# Patient Record
Sex: MALE | Race: WHITE | NOT HISPANIC OR LATINO
[De-identification: names, ages, dates, MRNs, and addresses within clinical notes are randomized per-mention and may not be internally consistent; named-entity substitution may affect disease eponyms.]

---

## 2017-04-05 PROBLEM — Z00.00 ENCOUNTER FOR PREVENTIVE HEALTH EXAMINATION: Status: ACTIVE | Noted: 2017-04-05

## 2017-04-06 ENCOUNTER — APPOINTMENT (OUTPATIENT)
Dept: ORTHOPEDIC SURGERY | Facility: CLINIC | Age: 72
End: 2017-04-06

## 2017-04-06 VITALS — WEIGHT: 188 LBS | HEIGHT: 72 IN | BODY MASS INDEX: 25.47 KG/M2

## 2017-04-06 VITALS — DIASTOLIC BLOOD PRESSURE: 87 MMHG | HEART RATE: 84 BPM | SYSTOLIC BLOOD PRESSURE: 136 MMHG

## 2017-04-06 DIAGNOSIS — Z78.9 OTHER SPECIFIED HEALTH STATUS: ICD-10-CM

## 2017-04-06 DIAGNOSIS — M25.569 PAIN IN UNSPECIFIED KNEE: ICD-10-CM

## 2017-05-11 PROBLEM — Z78.9 EXERCISES OCCASIONALLY: Status: ACTIVE | Noted: 2017-04-06

## 2017-05-11 PROBLEM — Z78.9 DENIES ALCOHOL CONSUMPTION: Status: ACTIVE | Noted: 2017-04-06

## 2017-05-11 PROBLEM — Z78.9 DOES NOT USE ILLICIT DRUGS: Status: ACTIVE | Noted: 2017-04-06

## 2017-05-11 RX ORDER — CHOLECALCIFEROL (VITAMIN D3) 25 MCG
TABLET ORAL
Refills: 0 | Status: ACTIVE | COMMUNITY

## 2017-05-11 RX ORDER — RIBOFLAVIN (VITAMIN B2) 100 MG
TABLET ORAL
Refills: 0 | Status: ACTIVE | COMMUNITY

## 2017-05-11 RX ORDER — BLOOD SUGAR DIAGNOSTIC
STRIP MISCELLANEOUS
Qty: 400 | Refills: 0 | Status: ACTIVE | COMMUNITY
Start: 2016-12-08

## 2017-05-11 RX ORDER — GLUC/MSM/COLGN2/HYAL/ANTIARTH3 375-375-20
TABLET ORAL
Refills: 0 | Status: ACTIVE | COMMUNITY

## 2017-05-11 RX ORDER — MULTIVIT-MIN/FA/LYCOPEN/LUTEIN .4-300-25
TABLET ORAL
Refills: 0 | Status: ACTIVE | COMMUNITY

## 2017-05-11 RX ORDER — ASCORBIC ACID 500 MG
TABLET ORAL
Refills: 0 | Status: ACTIVE | COMMUNITY

## 2017-05-30 ENCOUNTER — RX RENEWAL (OUTPATIENT)
Age: 72
End: 2017-05-30

## 2017-07-14 ENCOUNTER — RX RENEWAL (OUTPATIENT)
Age: 72
End: 2017-07-14

## 2017-07-14 ENCOUNTER — APPOINTMENT (OUTPATIENT)
Dept: ORTHOPEDIC SURGERY | Facility: CLINIC | Age: 72
End: 2017-07-14

## 2017-07-14 VITALS
HEART RATE: 81 BPM | SYSTOLIC BLOOD PRESSURE: 136 MMHG | WEIGHT: 188 LBS | HEIGHT: 72 IN | DIASTOLIC BLOOD PRESSURE: 72 MMHG | BODY MASS INDEX: 25.47 KG/M2

## 2017-08-10 ENCOUNTER — RX RENEWAL (OUTPATIENT)
Age: 72
End: 2017-08-10

## 2017-08-20 ENCOUNTER — RX RENEWAL (OUTPATIENT)
Age: 72
End: 2017-08-20

## 2017-09-07 ENCOUNTER — RX RENEWAL (OUTPATIENT)
Age: 72
End: 2017-09-07

## 2017-09-08 ENCOUNTER — APPOINTMENT (OUTPATIENT)
Dept: ORTHOPEDIC SURGERY | Facility: CLINIC | Age: 72
End: 2017-09-08
Payer: MEDICARE

## 2017-09-08 VITALS
WEIGHT: 188 LBS | BODY MASS INDEX: 25.47 KG/M2 | SYSTOLIC BLOOD PRESSURE: 134 MMHG | HEIGHT: 72 IN | HEART RATE: 82 BPM | DIASTOLIC BLOOD PRESSURE: 77 MMHG

## 2017-09-08 PROCEDURE — 99214 OFFICE O/P EST MOD 30 MIN: CPT

## 2017-10-09 ENCOUNTER — RX RENEWAL (OUTPATIENT)
Age: 72
End: 2017-10-09

## 2017-11-07 ENCOUNTER — RX RENEWAL (OUTPATIENT)
Age: 72
End: 2017-11-07

## 2017-11-21 ENCOUNTER — OUTPATIENT (OUTPATIENT)
Dept: OUTPATIENT SERVICES | Facility: HOSPITAL | Age: 72
LOS: 1 days | End: 2017-11-21
Payer: MEDICARE

## 2017-11-21 VITALS
WEIGHT: 199.96 LBS | SYSTOLIC BLOOD PRESSURE: 130 MMHG | DIASTOLIC BLOOD PRESSURE: 84 MMHG | HEART RATE: 81 BPM | HEIGHT: 72 IN | TEMPERATURE: 97 F | RESPIRATION RATE: 16 BRPM

## 2017-11-21 DIAGNOSIS — M17.11 UNILATERAL PRIMARY OSTEOARTHRITIS, RIGHT KNEE: ICD-10-CM

## 2017-11-21 DIAGNOSIS — E11.9 TYPE 2 DIABETES MELLITUS WITHOUT COMPLICATIONS: ICD-10-CM

## 2017-11-21 DIAGNOSIS — I10 ESSENTIAL (PRIMARY) HYPERTENSION: ICD-10-CM

## 2017-11-21 DIAGNOSIS — Z98.890 OTHER SPECIFIED POSTPROCEDURAL STATES: Chronic | ICD-10-CM

## 2017-11-21 DIAGNOSIS — E03.9 HYPOTHYROIDISM, UNSPECIFIED: ICD-10-CM

## 2017-11-21 DIAGNOSIS — E78.5 HYPERLIPIDEMIA, UNSPECIFIED: ICD-10-CM

## 2017-11-21 LAB
APPEARANCE UR: CLEAR — SIGNIFICANT CHANGE UP
BILIRUB UR-MCNC: NEGATIVE — SIGNIFICANT CHANGE UP
BLD GP AB SCN SERPL QL: NEGATIVE — SIGNIFICANT CHANGE UP
BLOOD UR QL VISUAL: NEGATIVE — SIGNIFICANT CHANGE UP
BUN SERPL-MCNC: 25 MG/DL — HIGH (ref 7–23)
CALCIUM SERPL-MCNC: 9.8 MG/DL — SIGNIFICANT CHANGE UP (ref 8.4–10.5)
CHLORIDE SERPL-SCNC: 100 MMOL/L — SIGNIFICANT CHANGE UP (ref 98–107)
CO2 SERPL-SCNC: 28 MMOL/L — SIGNIFICANT CHANGE UP (ref 22–31)
COLOR SPEC: SIGNIFICANT CHANGE UP
CREAT SERPL-MCNC: 0.91 MG/DL — SIGNIFICANT CHANGE UP (ref 0.5–1.3)
GLUCOSE SERPL-MCNC: 115 MG/DL — HIGH (ref 70–99)
GLUCOSE UR-MCNC: NEGATIVE — SIGNIFICANT CHANGE UP
HBA1C BLD-MCNC: 6.1 % — HIGH (ref 4–5.6)
HCT VFR BLD CALC: 46.5 % — SIGNIFICANT CHANGE UP (ref 39–50)
HGB BLD-MCNC: 14.7 G/DL — SIGNIFICANT CHANGE UP (ref 13–17)
KETONES UR-MCNC: NEGATIVE — SIGNIFICANT CHANGE UP
LEUKOCYTE ESTERASE UR-ACNC: NEGATIVE — SIGNIFICANT CHANGE UP
MCHC RBC-ENTMCNC: 28.9 PG — SIGNIFICANT CHANGE UP (ref 27–34)
MCHC RBC-ENTMCNC: 31.6 % — LOW (ref 32–36)
MCV RBC AUTO: 91.4 FL — SIGNIFICANT CHANGE UP (ref 80–100)
NITRITE UR-MCNC: NEGATIVE — SIGNIFICANT CHANGE UP
NRBC # FLD: 0 — SIGNIFICANT CHANGE UP
PH UR: 6 — SIGNIFICANT CHANGE UP (ref 4.6–8)
PLATELET # BLD AUTO: 369 K/UL — SIGNIFICANT CHANGE UP (ref 150–400)
PMV BLD: 9.9 FL — SIGNIFICANT CHANGE UP (ref 7–13)
POTASSIUM SERPL-MCNC: 4.7 MMOL/L — SIGNIFICANT CHANGE UP (ref 3.5–5.3)
POTASSIUM SERPL-SCNC: 4.7 MMOL/L — SIGNIFICANT CHANGE UP (ref 3.5–5.3)
PROT UR-MCNC: NEGATIVE — SIGNIFICANT CHANGE UP
RBC # BLD: 5.09 M/UL — SIGNIFICANT CHANGE UP (ref 4.2–5.8)
RBC # FLD: 14.1 % — SIGNIFICANT CHANGE UP (ref 10.3–14.5)
RH IG SCN BLD-IMP: POSITIVE — SIGNIFICANT CHANGE UP
SODIUM SERPL-SCNC: 142 MMOL/L — SIGNIFICANT CHANGE UP (ref 135–145)
SP GR SPEC: 1.01 — SIGNIFICANT CHANGE UP (ref 1–1.03)
SQUAMOUS # UR AUTO: SIGNIFICANT CHANGE UP
UROBILINOGEN FLD QL: NORMAL E.U. — SIGNIFICANT CHANGE UP (ref 0.1–0.2)
WBC # BLD: 8.73 K/UL — SIGNIFICANT CHANGE UP (ref 3.8–10.5)
WBC # FLD AUTO: 8.73 K/UL — SIGNIFICANT CHANGE UP (ref 3.8–10.5)
WBC UR QL: SIGNIFICANT CHANGE UP (ref 0–?)

## 2017-11-21 PROCEDURE — 93010 ELECTROCARDIOGRAM REPORT: CPT

## 2017-11-21 RX ORDER — PANTOPRAZOLE SODIUM 20 MG/1
40 TABLET, DELAYED RELEASE ORAL ONCE
Qty: 0 | Refills: 0 | Status: COMPLETED | OUTPATIENT
Start: 2017-12-05 | End: 2017-12-05

## 2017-11-21 RX ORDER — TRAMADOL HYDROCHLORIDE 50 MG/1
50 TABLET ORAL ONCE
Qty: 0 | Refills: 0 | Status: DISCONTINUED | OUTPATIENT
Start: 2017-12-05 | End: 2017-12-05

## 2017-11-21 RX ORDER — GABAPENTIN 400 MG/1
100 CAPSULE ORAL ONCE
Qty: 0 | Refills: 0 | Status: COMPLETED | OUTPATIENT
Start: 2017-12-05 | End: 2017-12-05

## 2017-11-21 RX ORDER — ACETAMINOPHEN 500 MG
975 TABLET ORAL ONCE
Qty: 0 | Refills: 0 | Status: COMPLETED | OUTPATIENT
Start: 2017-12-05 | End: 2017-12-05

## 2017-11-21 RX ORDER — SODIUM CHLORIDE 9 MG/ML
3 INJECTION INTRAMUSCULAR; INTRAVENOUS; SUBCUTANEOUS EVERY 8 HOURS
Qty: 0 | Refills: 0 | Status: DISCONTINUED | OUTPATIENT
Start: 2017-12-05 | End: 2017-12-07

## 2017-11-21 RX ORDER — SODIUM CHLORIDE 9 MG/ML
1000 INJECTION, SOLUTION INTRAVENOUS
Qty: 0 | Refills: 0 | Status: DISCONTINUED | OUTPATIENT
Start: 2017-12-05 | End: 2017-12-05

## 2017-11-21 NOTE — H&P PST ADULT - ENDOCRINE COMMENTS
Hypothyroidism & T2DM - reports blood sugar done every morning less than 100mg/dl, and reports she gets regular thyroid checks with Endocrinologist

## 2017-11-21 NOTE — H&P PST ADULT - LYMPHATIC
supraclavicular R/posterior cervical R/posterior cervical L/anterior cervical L/anterior cervical R/supraclavicular L

## 2017-11-21 NOTE — H&P PST ADULT - GASTROINTESTINAL DETAILS
soft/nontender/no distention/bowel sounds normal nontender/bowel sounds normal/soft/no distention/no masses palpable

## 2017-11-21 NOTE — H&P PST ADULT - NEGATIVE CARDIOVASCULAR SYMPTOMS
no peripheral edema/no claudication/no dyspnea on exertion/no chest pain/no orthopnea/no paroxysmal nocturnal dyspnea/no palpitations

## 2017-11-21 NOTE — H&P PST ADULT - NSANTHOSAYNRD_GEN_A_CORE
No. GOYO screening performed.  STOP BANG Legend: 0-2 = LOW Risk; 3-4 = INTERMEDIATE Risk; 5-8 = HIGH Risk

## 2017-11-21 NOTE — H&P PST ADULT - PMH
Basal cell carcinoma    Diabetes mellitus type II, controlled    Hyperlipidemia    Hypertension    Hypothyroidism    Osteoarthritis

## 2017-11-21 NOTE — H&P PST ADULT - PROBLEM SELECTOR PLAN 1
Total Knee Replacement, Right scheduled for 12/04/2017. Total Knee Replacement, Right scheduled for 12/04/2017.  Pre-op instructions given. Pt verbalized understanding.  Pepcid given for GI prophylaxis.  Nasal culture instructions given.  Chlorhexidine wash instructions given.  ABO ordered STAT for day of procedure.  Accu-Chek ordered STAT for day of procedure.  Medical clearance requested.

## 2017-11-21 NOTE — H&P PST ADULT - HISTORY OF PRESENT ILLNESS
70yo male with medical h/o HTN, HDL, Hypothyroidism, T2DM, Hearing impairment with bilateral hearing aid and OA, right knee. Pt presents today for presurgical evaluation for Total Knee Replacement, Right scheduled for 12/04/2017.

## 2017-11-21 NOTE — H&P PST ADULT - ACTIVITY
ADLs, chores, walks a lot , ride a bike, used to run marathons but stopped x 1 year because of the knee

## 2017-11-21 NOTE — H&P PST ADULT - PROBLEM SELECTOR PLAN 2
Pt instructed to take meds as prescribed Pt instructed to take meds as prescribed.  GOYO precaution - OR booking notified

## 2017-11-21 NOTE — H&P PST ADULT - MUSCULOSKELETAL
details… detailed exam no joint warmth/no calf tenderness/normal strength/no joint swelling/no joint erythema/decreased ROM due to pain

## 2017-11-22 LAB
SPECIMEN SOURCE: SIGNIFICANT CHANGE UP
SPECIMEN SOURCE: SIGNIFICANT CHANGE UP

## 2017-11-23 LAB
BACTERIA NPH CULT: SIGNIFICANT CHANGE UP
BACTERIA UR CULT: SIGNIFICANT CHANGE UP

## 2017-11-27 ENCOUNTER — RX RENEWAL (OUTPATIENT)
Age: 72
End: 2017-11-27

## 2017-11-27 RX ORDER — MUPIROCIN 20 MG/G
2 OINTMENT TOPICAL
Qty: 22 | Refills: 0 | Status: ACTIVE | COMMUNITY
Start: 2017-11-27 | End: 1900-01-01

## 2017-11-29 ENCOUNTER — OTHER (OUTPATIENT)
Age: 72
End: 2017-11-29

## 2017-11-29 DIAGNOSIS — M17.11 UNILATERAL PRIMARY OSTEOARTHRITIS, RIGHT KNEE: ICD-10-CM

## 2017-11-29 DIAGNOSIS — M17.0 BILATERAL PRIMARY OSTEOARTHRITIS OF KNEE: ICD-10-CM

## 2017-12-04 ENCOUNTER — FORM ENCOUNTER (OUTPATIENT)
Age: 72
End: 2017-12-04

## 2017-12-04 NOTE — ASU PATIENT PROFILE, ADULT - PSH
S/P Mohs surgery for basal cell carcinoma Cancer of tear duct, right    H/O left inguinal hernia repair    S/P Mohs surgery for basal cell carcinoma

## 2017-12-05 ENCOUNTER — APPOINTMENT (OUTPATIENT)
Dept: ORTHOPEDIC SURGERY | Facility: HOSPITAL | Age: 72
End: 2017-12-05

## 2017-12-05 ENCOUNTER — RESULT REVIEW (OUTPATIENT)
Age: 72
End: 2017-12-05

## 2017-12-05 ENCOUNTER — RX RENEWAL (OUTPATIENT)
Age: 72
End: 2017-12-05

## 2017-12-05 ENCOUNTER — INPATIENT (INPATIENT)
Facility: HOSPITAL | Age: 72
LOS: 1 days | Discharge: HOME HEALTH | End: 2017-12-07
Attending: ORTHOPAEDIC SURGERY | Admitting: ORTHOPAEDIC SURGERY
Payer: MEDICARE

## 2017-12-05 VITALS
DIASTOLIC BLOOD PRESSURE: 71 MMHG | OXYGEN SATURATION: 96 % | HEIGHT: 72 IN | WEIGHT: 199.96 LBS | RESPIRATION RATE: 16 BRPM | SYSTOLIC BLOOD PRESSURE: 126 MMHG | HEART RATE: 79 BPM | TEMPERATURE: 98 F

## 2017-12-05 DIAGNOSIS — Z98.890 OTHER SPECIFIED POSTPROCEDURAL STATES: Chronic | ICD-10-CM

## 2017-12-05 DIAGNOSIS — C69.51: Chronic | ICD-10-CM

## 2017-12-05 DIAGNOSIS — M17.11 UNILATERAL PRIMARY OSTEOARTHRITIS, RIGHT KNEE: ICD-10-CM

## 2017-12-05 LAB
BUN SERPL-MCNC: 22 MG/DL — SIGNIFICANT CHANGE UP (ref 7–23)
CALCIUM SERPL-MCNC: 8.6 MG/DL — SIGNIFICANT CHANGE UP (ref 8.4–10.5)
CHLORIDE SERPL-SCNC: 101 MMOL/L — SIGNIFICANT CHANGE UP (ref 98–107)
CO2 SERPL-SCNC: 27 MMOL/L — SIGNIFICANT CHANGE UP (ref 22–31)
CREAT SERPL-MCNC: 0.96 MG/DL — SIGNIFICANT CHANGE UP (ref 0.5–1.3)
GLUCOSE BLDC GLUCOMTR-MCNC: 132 MG/DL — HIGH (ref 70–99)
GLUCOSE BLDC GLUCOMTR-MCNC: 205 MG/DL — HIGH (ref 70–99)
GLUCOSE BLDC GLUCOMTR-MCNC: 235 MG/DL — HIGH (ref 70–99)
GLUCOSE SERPL-MCNC: 219 MG/DL — HIGH (ref 70–99)
HCT VFR BLD CALC: 40.6 % — SIGNIFICANT CHANGE UP (ref 39–50)
HGB BLD-MCNC: 13.4 G/DL — SIGNIFICANT CHANGE UP (ref 13–17)
MCHC RBC-ENTMCNC: 29.3 PG — SIGNIFICANT CHANGE UP (ref 27–34)
MCHC RBC-ENTMCNC: 33 % — SIGNIFICANT CHANGE UP (ref 32–36)
MCV RBC AUTO: 88.6 FL — SIGNIFICANT CHANGE UP (ref 80–100)
NRBC # FLD: 0 — SIGNIFICANT CHANGE UP
PLATELET # BLD AUTO: 307 K/UL — SIGNIFICANT CHANGE UP (ref 150–400)
PMV BLD: 9.3 FL — SIGNIFICANT CHANGE UP (ref 7–13)
POTASSIUM SERPL-MCNC: 4.7 MMOL/L — SIGNIFICANT CHANGE UP (ref 3.5–5.3)
POTASSIUM SERPL-SCNC: 4.7 MMOL/L — SIGNIFICANT CHANGE UP (ref 3.5–5.3)
RBC # BLD: 4.58 M/UL — SIGNIFICANT CHANGE UP (ref 4.2–5.8)
RBC # FLD: 13.9 % — SIGNIFICANT CHANGE UP (ref 10.3–14.5)
RH IG SCN BLD-IMP: POSITIVE — SIGNIFICANT CHANGE UP
SODIUM SERPL-SCNC: 139 MMOL/L — SIGNIFICANT CHANGE UP (ref 135–145)
WBC # BLD: 8.13 K/UL — SIGNIFICANT CHANGE UP (ref 3.8–10.5)
WBC # FLD AUTO: 8.13 K/UL — SIGNIFICANT CHANGE UP (ref 3.8–10.5)

## 2017-12-05 PROCEDURE — 88305 TISSUE EXAM BY PATHOLOGIST: CPT | Mod: 26

## 2017-12-05 PROCEDURE — 88311 DECALCIFY TISSUE: CPT | Mod: 26

## 2017-12-05 PROCEDURE — 27447 TOTAL KNEE ARTHROPLASTY: CPT | Mod: RT

## 2017-12-05 PROCEDURE — 73560 X-RAY EXAM OF KNEE 1 OR 2: CPT | Mod: 26,RT

## 2017-12-05 RX ORDER — TRAMADOL HYDROCHLORIDE 50 MG/1
50 TABLET ORAL EVERY 8 HOURS
Qty: 0 | Refills: 0 | Status: DISCONTINUED | OUTPATIENT
Start: 2017-12-05 | End: 2017-12-05

## 2017-12-05 RX ORDER — DEXTROSE 50 % IN WATER 50 %
12.5 SYRINGE (ML) INTRAVENOUS ONCE
Qty: 0 | Refills: 0 | Status: DISCONTINUED | OUTPATIENT
Start: 2017-12-05 | End: 2017-12-07

## 2017-12-05 RX ORDER — SODIUM CHLORIDE 9 MG/ML
1000 INJECTION INTRAMUSCULAR; INTRAVENOUS; SUBCUTANEOUS ONCE
Qty: 0 | Refills: 0 | Status: COMPLETED | OUTPATIENT
Start: 2017-12-06 | End: 2017-12-06

## 2017-12-05 RX ORDER — GLUCAGON INJECTION, SOLUTION 0.5 MG/.1ML
1 INJECTION, SOLUTION SUBCUTANEOUS ONCE
Qty: 0 | Refills: 0 | Status: DISCONTINUED | OUTPATIENT
Start: 2017-12-05 | End: 2017-12-07

## 2017-12-05 RX ORDER — ONDANSETRON 8 MG/1
4 TABLET, FILM COATED ORAL ONCE
Qty: 0 | Refills: 0 | Status: DISCONTINUED | OUTPATIENT
Start: 2017-12-05 | End: 2017-12-05

## 2017-12-05 RX ORDER — POLYETHYLENE GLYCOL 3350 17 G/17G
17 POWDER, FOR SOLUTION ORAL DAILY
Qty: 0 | Refills: 0 | Status: DISCONTINUED | OUTPATIENT
Start: 2017-12-05 | End: 2017-12-07

## 2017-12-05 RX ORDER — CEFAZOLIN SODIUM 1 G
2000 VIAL (EA) INJECTION EVERY 8 HOURS
Qty: 0 | Refills: 0 | Status: COMPLETED | OUTPATIENT
Start: 2017-12-05 | End: 2017-12-06

## 2017-12-05 RX ORDER — ONDANSETRON 8 MG/1
4 TABLET, FILM COATED ORAL EVERY 6 HOURS
Qty: 0 | Refills: 0 | Status: DISCONTINUED | OUTPATIENT
Start: 2017-12-05 | End: 2017-12-07

## 2017-12-05 RX ORDER — ACETAMINOPHEN 500 MG
650 TABLET ORAL EVERY 6 HOURS
Qty: 0 | Refills: 0 | Status: DISCONTINUED | OUTPATIENT
Start: 2017-12-05 | End: 2017-12-07

## 2017-12-05 RX ORDER — INSULIN DETEMIR 100/ML (3)
50 INSULIN PEN (ML) SUBCUTANEOUS AT BEDTIME
Qty: 0 | Refills: 0 | Status: DISCONTINUED | OUTPATIENT
Start: 2017-12-05 | End: 2017-12-07

## 2017-12-05 RX ORDER — CELECOXIB 200 MG/1
200 CAPSULE ORAL
Qty: 0 | Refills: 0 | Status: DISCONTINUED | OUTPATIENT
Start: 2017-12-07 | End: 2017-12-07

## 2017-12-05 RX ORDER — HYDROCHLOROTHIAZIDE 25 MG
25 TABLET ORAL DAILY
Qty: 0 | Refills: 0 | Status: DISCONTINUED | OUTPATIENT
Start: 2017-12-05 | End: 2017-12-07

## 2017-12-05 RX ORDER — METFORMIN HYDROCHLORIDE 850 MG/1
1 TABLET ORAL
Qty: 0 | Refills: 0 | COMMUNITY

## 2017-12-05 RX ORDER — SODIUM CHLORIDE 9 MG/ML
1000 INJECTION INTRAMUSCULAR; INTRAVENOUS; SUBCUTANEOUS ONCE
Qty: 0 | Refills: 0 | Status: COMPLETED | OUTPATIENT
Start: 2017-12-05 | End: 2017-12-05

## 2017-12-05 RX ORDER — LEVOTHYROXINE SODIUM 125 MCG
75 TABLET ORAL DAILY
Qty: 0 | Refills: 0 | Status: DISCONTINUED | OUTPATIENT
Start: 2017-12-05 | End: 2017-12-07

## 2017-12-05 RX ORDER — INSULIN DETEMIR 100/ML (3)
50 INSULIN PEN (ML) SUBCUTANEOUS
Qty: 0 | Refills: 0 | COMMUNITY

## 2017-12-05 RX ORDER — DEXTROSE 50 % IN WATER 50 %
25 SYRINGE (ML) INTRAVENOUS ONCE
Qty: 0 | Refills: 0 | Status: DISCONTINUED | OUTPATIENT
Start: 2017-12-05 | End: 2017-12-07

## 2017-12-05 RX ORDER — ASPIRIN/CALCIUM CARB/MAGNESIUM 324 MG
325 TABLET ORAL
Qty: 0 | Refills: 0 | Status: DISCONTINUED | OUTPATIENT
Start: 2017-12-05 | End: 2017-12-07

## 2017-12-05 RX ORDER — LEVOTHYROXINE SODIUM 125 MCG
1 TABLET ORAL
Qty: 0 | Refills: 0 | COMMUNITY

## 2017-12-05 RX ORDER — ATORVASTATIN CALCIUM 80 MG/1
1 TABLET, FILM COATED ORAL
Qty: 0 | Refills: 0 | COMMUNITY

## 2017-12-05 RX ORDER — GLIMEPIRIDE 1 MG
1 TABLET ORAL
Qty: 0 | Refills: 0 | COMMUNITY

## 2017-12-05 RX ORDER — FAMOTIDINE 10 MG/ML
40 INJECTION INTRAVENOUS DAILY
Qty: 0 | Refills: 0 | Status: DISCONTINUED | OUTPATIENT
Start: 2017-12-05 | End: 2017-12-07

## 2017-12-05 RX ORDER — OXYCODONE HYDROCHLORIDE 5 MG/1
10 TABLET ORAL EVERY 4 HOURS
Qty: 0 | Refills: 0 | Status: DISCONTINUED | OUTPATIENT
Start: 2017-12-05 | End: 2017-12-07

## 2017-12-05 RX ORDER — FAMOTIDINE 10 MG/ML
1 INJECTION INTRAVENOUS
Qty: 0 | Refills: 0 | COMMUNITY

## 2017-12-05 RX ORDER — MAGNESIUM HYDROXIDE 400 MG/1
30 TABLET, CHEWABLE ORAL DAILY
Qty: 0 | Refills: 0 | Status: DISCONTINUED | OUTPATIENT
Start: 2017-12-05 | End: 2017-12-07

## 2017-12-05 RX ORDER — DOCUSATE SODIUM 100 MG
100 CAPSULE ORAL THREE TIMES A DAY
Qty: 0 | Refills: 0 | Status: DISCONTINUED | OUTPATIENT
Start: 2017-12-05 | End: 2017-12-07

## 2017-12-05 RX ORDER — SENNA PLUS 8.6 MG/1
2 TABLET ORAL AT BEDTIME
Qty: 0 | Refills: 0 | Status: DISCONTINUED | OUTPATIENT
Start: 2017-12-05 | End: 2017-12-05

## 2017-12-05 RX ORDER — KETOROLAC TROMETHAMINE 30 MG/ML
15 SYRINGE (ML) INJECTION EVERY 6 HOURS
Qty: 0 | Refills: 0 | Status: DISCONTINUED | OUTPATIENT
Start: 2017-12-05 | End: 2017-12-06

## 2017-12-05 RX ORDER — INSULIN LISPRO 100/ML
VIAL (ML) SUBCUTANEOUS AT BEDTIME
Qty: 0 | Refills: 0 | Status: DISCONTINUED | OUTPATIENT
Start: 2017-12-05 | End: 2017-12-07

## 2017-12-05 RX ORDER — OXYCODONE HYDROCHLORIDE 5 MG/1
5 TABLET ORAL EVERY 4 HOURS
Qty: 0 | Refills: 0 | Status: DISCONTINUED | OUTPATIENT
Start: 2017-12-05 | End: 2017-12-07

## 2017-12-05 RX ORDER — HYDROMORPHONE HYDROCHLORIDE 2 MG/ML
0.5 INJECTION INTRAMUSCULAR; INTRAVENOUS; SUBCUTANEOUS EVERY 4 HOURS
Qty: 0 | Refills: 0 | Status: DISCONTINUED | OUTPATIENT
Start: 2017-12-05 | End: 2017-12-07

## 2017-12-05 RX ORDER — GABAPENTIN 400 MG/1
100 CAPSULE ORAL EVERY 8 HOURS
Qty: 0 | Refills: 0 | Status: DISCONTINUED | OUTPATIENT
Start: 2017-12-05 | End: 2017-12-07

## 2017-12-05 RX ORDER — SENNA PLUS 8.6 MG/1
2 TABLET ORAL AT BEDTIME
Qty: 0 | Refills: 0 | Status: DISCONTINUED | OUTPATIENT
Start: 2017-12-05 | End: 2017-12-07

## 2017-12-05 RX ORDER — FENTANYL CITRATE 50 UG/ML
50 INJECTION INTRAVENOUS
Qty: 0 | Refills: 0 | Status: DISCONTINUED | OUTPATIENT
Start: 2017-12-05 | End: 2017-12-05

## 2017-12-05 RX ORDER — ATORVASTATIN CALCIUM 80 MG/1
20 TABLET, FILM COATED ORAL AT BEDTIME
Qty: 0 | Refills: 0 | Status: DISCONTINUED | OUTPATIENT
Start: 2017-12-05 | End: 2017-12-07

## 2017-12-05 RX ORDER — TRAMADOL HYDROCHLORIDE 50 MG/1
25 TABLET ORAL EVERY 8 HOURS
Qty: 0 | Refills: 0 | Status: DISCONTINUED | OUTPATIENT
Start: 2017-12-05 | End: 2017-12-07

## 2017-12-05 RX ORDER — SODIUM CHLORIDE 9 MG/ML
1000 INJECTION, SOLUTION INTRAVENOUS
Qty: 0 | Refills: 0 | Status: DISCONTINUED | OUTPATIENT
Start: 2017-12-05 | End: 2017-12-07

## 2017-12-05 RX ORDER — DEXTROSE 50 % IN WATER 50 %
1 SYRINGE (ML) INTRAVENOUS ONCE
Qty: 0 | Refills: 0 | Status: DISCONTINUED | OUTPATIENT
Start: 2017-12-05 | End: 2017-12-07

## 2017-12-05 RX ORDER — OMEPRAZOLE 10 MG/1
1 CAPSULE, DELAYED RELEASE ORAL
Qty: 0 | Refills: 0 | COMMUNITY

## 2017-12-05 RX ORDER — INSULIN LISPRO 100/ML
VIAL (ML) SUBCUTANEOUS
Qty: 0 | Refills: 0 | Status: DISCONTINUED | OUTPATIENT
Start: 2017-12-05 | End: 2017-12-07

## 2017-12-05 RX ORDER — PANTOPRAZOLE SODIUM 20 MG/1
40 TABLET, DELAYED RELEASE ORAL DAILY
Qty: 0 | Refills: 0 | Status: DISCONTINUED | OUTPATIENT
Start: 2017-12-05 | End: 2017-12-07

## 2017-12-05 RX ORDER — HYDROMORPHONE HYDROCHLORIDE 2 MG/ML
0.5 INJECTION INTRAMUSCULAR; INTRAVENOUS; SUBCUTANEOUS
Qty: 0 | Refills: 0 | Status: DISCONTINUED | OUTPATIENT
Start: 2017-12-05 | End: 2017-12-05

## 2017-12-05 RX ADMIN — ATORVASTATIN CALCIUM 20 MILLIGRAM(S): 80 TABLET, FILM COATED ORAL at 22:15

## 2017-12-05 RX ADMIN — Medication 20 MILLIGRAM(S): at 18:14

## 2017-12-05 RX ADMIN — SODIUM CHLORIDE 150 MILLILITER(S): 9 INJECTION, SOLUTION INTRAVENOUS at 11:06

## 2017-12-05 RX ADMIN — GABAPENTIN 100 MILLIGRAM(S): 400 CAPSULE ORAL at 06:57

## 2017-12-05 RX ADMIN — Medication 4: at 18:14

## 2017-12-05 RX ADMIN — Medication 15 MILLIGRAM(S): at 17:21

## 2017-12-05 RX ADMIN — Medication 50 UNIT(S): at 22:16

## 2017-12-05 RX ADMIN — GABAPENTIN 100 MILLIGRAM(S): 400 CAPSULE ORAL at 22:16

## 2017-12-05 RX ADMIN — TRAMADOL HYDROCHLORIDE 25 MILLIGRAM(S): 50 TABLET ORAL at 14:52

## 2017-12-05 RX ADMIN — SODIUM CHLORIDE 3 MILLILITER(S): 9 INJECTION INTRAMUSCULAR; INTRAVENOUS; SUBCUTANEOUS at 14:00

## 2017-12-05 RX ADMIN — Medication 100 MILLIGRAM(S): at 22:16

## 2017-12-05 RX ADMIN — TRAMADOL HYDROCHLORIDE 25 MILLIGRAM(S): 50 TABLET ORAL at 22:15

## 2017-12-05 RX ADMIN — Medication 975 MILLIGRAM(S): at 06:57

## 2017-12-05 RX ADMIN — FAMOTIDINE 40 MILLIGRAM(S): 10 INJECTION INTRAVENOUS at 13:03

## 2017-12-05 RX ADMIN — SODIUM CHLORIDE 150 MILLILITER(S): 9 INJECTION, SOLUTION INTRAVENOUS at 14:54

## 2017-12-05 RX ADMIN — SODIUM CHLORIDE 3 MILLILITER(S): 9 INJECTION INTRAMUSCULAR; INTRAVENOUS; SUBCUTANEOUS at 22:16

## 2017-12-05 RX ADMIN — Medication 100 MILLIGRAM(S): at 14:51

## 2017-12-05 RX ADMIN — Medication 325 MILLIGRAM(S): at 17:21

## 2017-12-05 RX ADMIN — Medication 325 MILLIGRAM(S): at 13:04

## 2017-12-05 RX ADMIN — GABAPENTIN 100 MILLIGRAM(S): 400 CAPSULE ORAL at 14:52

## 2017-12-05 RX ADMIN — SODIUM CHLORIDE 2000 MILLILITER(S): 9 INJECTION INTRAMUSCULAR; INTRAVENOUS; SUBCUTANEOUS at 14:54

## 2017-12-05 RX ADMIN — SODIUM CHLORIDE 150 MILLILITER(S): 9 INJECTION, SOLUTION INTRAVENOUS at 11:05

## 2017-12-05 RX ADMIN — SENNA PLUS 2 TABLET(S): 8.6 TABLET ORAL at 22:15

## 2017-12-05 RX ADMIN — TRAMADOL HYDROCHLORIDE 50 MILLIGRAM(S): 50 TABLET ORAL at 06:58

## 2017-12-05 RX ADMIN — Medication 650 MILLIGRAM(S): at 18:14

## 2017-12-05 RX ADMIN — Medication 100 MILLIGRAM(S): at 17:21

## 2017-12-05 RX ADMIN — POLYETHYLENE GLYCOL 3350 17 GRAM(S): 17 POWDER, FOR SOLUTION ORAL at 14:54

## 2017-12-05 RX ADMIN — OXYCODONE HYDROCHLORIDE 10 MILLIGRAM(S): 5 TABLET ORAL at 19:04

## 2017-12-05 RX ADMIN — OXYCODONE HYDROCHLORIDE 10 MILLIGRAM(S): 5 TABLET ORAL at 19:51

## 2017-12-05 RX ADMIN — SODIUM CHLORIDE 30 MILLILITER(S): 9 INJECTION, SOLUTION INTRAVENOUS at 06:59

## 2017-12-05 RX ADMIN — PANTOPRAZOLE SODIUM 40 MILLIGRAM(S): 20 TABLET, DELAYED RELEASE ORAL at 06:58

## 2017-12-05 NOTE — PHYSICAL THERAPY INITIAL EVALUATION ADULT - CRITERIA FOR SKILLED THERAPEUTIC INTERVENTIONS
anticipated discharge recommendation/rehab potential/anticipated D/C to home with home PT services to address current functional limitations to optimize safety within the home environment./impairments found/functional limitations in following categories

## 2017-12-05 NOTE — PATIENT PROFILE ADULT. - PSH
Cancer of tear duct, right    H/O left inguinal hernia repair    S/P Mohs surgery for basal cell carcinoma

## 2017-12-05 NOTE — PHYSICAL THERAPY INITIAL EVALUATION ADULT - PERTINENT HX OF CURRENT PROBLEM, REHAB EVAL
70yo male with medical h/o HTN, HDL, Hypothyroidism, T2DM, Hearing impairment with bilateral hearing aid and OA, right knee. Pt presents today for presurgical evaluation for Total Knee Replacement

## 2017-12-05 NOTE — PHYSICAL THERAPY INITIAL EVALUATION ADULT - PATIENT PROFILE REVIEW, REHAB EVAL
Pt. profile reviewed, consulted with RN Corinne Depaula prior to initial PT evaluation and tx, as per RN, Pt. is OK to participate in skilled therapy session, current activity orders; ambulate as tolerated./yes

## 2017-12-05 NOTE — PHYSICAL THERAPY INITIAL EVALUATION ADULT - PLANNED THERAPY INTERVENTIONS, PT EVAL
balance training/bed mobility training/strengthening/gait training/transfer training/stair negotiation

## 2017-12-05 NOTE — OCCUPATIONAL THERAPY INITIAL EVALUATION ADULT - NS ASR DRESSING EQUIP NEEDS
sock aide/dressing stick/reacher/long handled shoe horn/Pt. to be educated on benefits and how to privately purchase during upcoming ADL session.

## 2017-12-05 NOTE — PHYSICAL THERAPY INITIAL EVALUATION ADULT - DISCHARGE DISPOSITION, PT EVAL
anticipated D/C to home with home PT services to address current functional limitations to optimize safety within the home environment./home w/ home PT

## 2017-12-05 NOTE — OCCUPATIONAL THERAPY INITIAL EVALUATION ADULT - NS ASR BATHING EQUIP NEEDS
shower chair/Pt. to be educated on benefits and how to privately purchase during upcoming ADL session.

## 2017-12-05 NOTE — PHYSICAL THERAPY INITIAL EVALUATION ADULT - GAIT DEVIATIONS NOTED, PT EVAL
decreased hip and knee flexion (Right LE> Left LE)/decreased chaim/decreased weight-shifting ability/decreased step length

## 2017-12-05 NOTE — OCCUPATIONAL THERAPY INITIAL EVALUATION ADULT - GENERAL OBSERVATIONS, REHAB EVAL
Pt. received semisupine on stretcher in hallway of 9T (outside room 922) from PACU. No acute distress. +Clean dry intact dressing to Right Knee, +Right Knee Immobilizer, +IV, +Accordion Drain, +B/L foot pumps. PT present bedside to perform PT evaluation in conjunction with OT evaluation. RN present. Wife present.

## 2017-12-05 NOTE — PATIENT PROFILE ADULT. - NS PRO AD PATIENT TYPE
Do Not Resuscitate (DNR)/Health Care Proxy (HCP)/pt reports he has them at home pt reports he has them at home/Health Care Proxy (HCP)

## 2017-12-05 NOTE — OCCUPATIONAL THERAPY INITIAL EVALUATION ADULT - MD ORDER
Occupational Therapy (OT) to evaluate and treat. Occupational Therapy (OT) to evaluate and treat. Out of bed to Chair. Per RN Corinne, pt is okay to participate in OT evaluation and perform activity as tolerated.

## 2017-12-05 NOTE — PHYSICAL THERAPY INITIAL EVALUATION ADULT - MANUAL MUSCLE TESTING RESULTS, REHAB EVAL
Bilateral UE muscle strength grossly 3/5 throughout, Left LE muscle strength grossly 3/5 throughout, Right hip and ankle muscle strength grossly 3/5 throughout, unable to fully assess right knee muscle strength.

## 2017-12-05 NOTE — PROGRESS NOTE ADULT - SUBJECTIVE AND OBJECTIVE BOX
Patient is seen and examined at bedside. Denies CP/SOB/Dizziness/N/V/D/HA. Pain is controlled.     Vital Signs Last 24 Hrs  T(C): 36.4 (05 Dec 2017 11:05), Max: 36.4 (05 Dec 2017 06:22)  T(F): 97.5 (05 Dec 2017 11:05), Max: 97.5 (05 Dec 2017 06:22)  HR: 83 (05 Dec 2017 11:45) (78 - 85)  BP: 100/51 (05 Dec 2017 11:45) (100/51 - 126/71)  BP(mean): --  RR: 17 (05 Dec 2017 11:45) (8 - 17)  SpO2: 98% (05 Dec 2017 11:45) (96% - 100%)    GEN: NAD     RLE: Dressing C/D/I.  HV in place   Unable to assess motor or sensory due to spina;. Extremity warm. Compartments are soft. DP 1+    Labs:                          13.4   8.13  )-----------( 307      ( 05 Dec 2017 11:25 )             40.6               A/P: Patient is a 71y y/o Male s/p right total knee arthroplasty , POD # 0    -Pain control/analgesia  -Inc spirometry  -Venodynes/foot pumps  -F/U AM Labs  -PT/OT/WBAT  -Antibiotic - ancef  -Anticoagulation - ASA BID  -Recheck exam when spinal resolves  -FU BMP Patient is seen and examined at bedside. Denies CP/SOB/Dizziness/N/V/D/HA. Pain is controlled.     Vital Signs Last 24 Hrs  T(C): 36.4 (05 Dec 2017 11:05), Max: 36.4 (05 Dec 2017 06:22)  T(F): 97.5 (05 Dec 2017 11:05), Max: 97.5 (05 Dec 2017 06:22)  HR: 83 (05 Dec 2017 11:45) (78 - 85)  BP: 100/51 (05 Dec 2017 11:45) (100/51 - 126/71)  BP(mean): --  RR: 17 (05 Dec 2017 11:45) (8 - 17)  SpO2: 98% (05 Dec 2017 11:45) (96% - 100%)    GEN: NAD     RLE: Dressing C/D/I.  HV in place   Unable to assess motor or sensory due to spina;. Extremity warm. Compartments are soft. DP 1+    Labs:                          13.4   8.13  )-----------( 307      ( 05 Dec 2017 11:25 )             40.6               A/P: Patient is a 71y y/o Male s/p right total knee arthroplasty , POD # 0    -Pain control/analgesia  -Inc spirometry  -Venodynes/foot pumps  -F/U AM Labs  -PT/OT/WBAT  -Antibiotic - ancef  -Anticoagulation - ASA BID  -Recheck exam when spinal resolves  -FU [facility]      Addendum 14:23    Patient is seen and examined.  Pain is controlled.  Patient ambulated with PT.    Vital Signs Last 24 Hrs  T(C): 37.3 (05 Dec 2017 14:14), Max: 37.3 (05 Dec 2017 14:14)  T(F): 99.2 (05 Dec 2017 14:14), Max: 99.2 (05 Dec 2017 14:14)  HR: 89 (05 Dec 2017 14:14) (75 - 89)  BP: 135/78 (05 Dec 2017 14:14) (100/51 - 135/78)  BP(mean): --  RR: 16 (05 Dec 2017 14:14) (8 - 21)  SpO2: 100% (05 Dec 2017 14:14) (96% - 100%)    Gen: NAD      RLE: Dressing C/D/I. HV is in place.   Motor intact +TA/GS/EHL/FHL. Sensation is grossly intact distal.     139  |  101  |  22  ----------------------------<  219<H>  4.7   |  27  |  0.96    Ca    8.6      05 Dec 2017 11:25

## 2017-12-05 NOTE — PHYSICAL THERAPY INITIAL EVALUATION ADULT - ADDITIONAL COMMENTS
Pt. lives in a pvt home with their spouse. Pt. has 4 steps with HR x 1 to enter their home and has no steps to reach bedroom/bathroom secondary to first floor living accomodation. Pt. was previously ambulating household and community distances without the use of an AD independently. Pt. reports he recently received a rolling walker for home. Pt. was previously independent with ADLs. Pt. returned to bed at end of therapy session with all lines and tubes intact, call bell in reach and in NAD. RN in room.

## 2017-12-05 NOTE — OCCUPATIONAL THERAPY INITIAL EVALUATION ADULT - PERTINENT HX OF CURRENT PROBLEM, REHAB EVAL
Pt is a 71 year old male with medical history of HTN, HDL, Hypothyroidism, T2DM, Hearing impairment with bilateral hearing aids, and OA, right knee. Pt is now s/p Right Total Knee Replacement, on 12/5/17.

## 2017-12-05 NOTE — OCCUPATIONAL THERAPY INITIAL EVALUATION ADULT - PRECAUTIONS/LIMITATIONS, REHAB EVAL
Right Knee Immobilizer- only when ambulating, may discharge when patient able to actively straight leg raise./fall precautions/surgical precautions

## 2017-12-06 ENCOUNTER — TRANSCRIPTION ENCOUNTER (OUTPATIENT)
Age: 72
End: 2017-12-06

## 2017-12-06 LAB
BUN SERPL-MCNC: 20 MG/DL — SIGNIFICANT CHANGE UP (ref 7–23)
CALCIUM SERPL-MCNC: 8.8 MG/DL — SIGNIFICANT CHANGE UP (ref 8.4–10.5)
CHLORIDE SERPL-SCNC: 102 MMOL/L — SIGNIFICANT CHANGE UP (ref 98–107)
CO2 SERPL-SCNC: 25 MMOL/L — SIGNIFICANT CHANGE UP (ref 22–31)
CREAT SERPL-MCNC: 0.74 MG/DL — SIGNIFICANT CHANGE UP (ref 0.5–1.3)
GLUCOSE BLDC GLUCOMTR-MCNC: 150 MG/DL — HIGH (ref 70–99)
GLUCOSE BLDC GLUCOMTR-MCNC: 162 MG/DL — HIGH (ref 70–99)
GLUCOSE BLDC GLUCOMTR-MCNC: 185 MG/DL — HIGH (ref 70–99)
GLUCOSE BLDC GLUCOMTR-MCNC: 73 MG/DL — SIGNIFICANT CHANGE UP (ref 70–99)
GLUCOSE SERPL-MCNC: 107 MG/DL — HIGH (ref 70–99)
HCT VFR BLD CALC: 38.4 % — LOW (ref 39–50)
HGB BLD-MCNC: 12.7 G/DL — LOW (ref 13–17)
MCHC RBC-ENTMCNC: 29.5 PG — SIGNIFICANT CHANGE UP (ref 27–34)
MCHC RBC-ENTMCNC: 33.1 % — SIGNIFICANT CHANGE UP (ref 32–36)
MCV RBC AUTO: 89.3 FL — SIGNIFICANT CHANGE UP (ref 80–100)
NRBC # FLD: 0 — SIGNIFICANT CHANGE UP
PLATELET # BLD AUTO: 299 K/UL — SIGNIFICANT CHANGE UP (ref 150–400)
PMV BLD: 9.8 FL — SIGNIFICANT CHANGE UP (ref 7–13)
POTASSIUM SERPL-MCNC: 4.2 MMOL/L — SIGNIFICANT CHANGE UP (ref 3.5–5.3)
POTASSIUM SERPL-SCNC: 4.2 MMOL/L — SIGNIFICANT CHANGE UP (ref 3.5–5.3)
RBC # BLD: 4.3 M/UL — SIGNIFICANT CHANGE UP (ref 4.2–5.8)
RBC # FLD: 13.8 % — SIGNIFICANT CHANGE UP (ref 10.3–14.5)
SODIUM SERPL-SCNC: 140 MMOL/L — SIGNIFICANT CHANGE UP (ref 135–145)
WBC # BLD: 12.14 K/UL — HIGH (ref 3.8–10.5)
WBC # FLD AUTO: 12.14 K/UL — HIGH (ref 3.8–10.5)

## 2017-12-06 RX ADMIN — OXYCODONE HYDROCHLORIDE 10 MILLIGRAM(S): 5 TABLET ORAL at 23:20

## 2017-12-06 RX ADMIN — OXYCODONE HYDROCHLORIDE 10 MILLIGRAM(S): 5 TABLET ORAL at 22:38

## 2017-12-06 RX ADMIN — Medication 15 MILLIGRAM(S): at 00:28

## 2017-12-06 RX ADMIN — Medication 75 MICROGRAM(S): at 07:46

## 2017-12-06 RX ADMIN — Medication 100 MILLIGRAM(S): at 00:27

## 2017-12-06 RX ADMIN — Medication 20 MILLIGRAM(S): at 07:04

## 2017-12-06 RX ADMIN — Medication 650 MILLIGRAM(S): at 12:50

## 2017-12-06 RX ADMIN — Medication 325 MILLIGRAM(S): at 07:04

## 2017-12-06 RX ADMIN — Medication 15 MILLIGRAM(S): at 07:03

## 2017-12-06 RX ADMIN — Medication 100 MILLIGRAM(S): at 07:04

## 2017-12-06 RX ADMIN — PANTOPRAZOLE SODIUM 40 MILLIGRAM(S): 20 TABLET, DELAYED RELEASE ORAL at 11:59

## 2017-12-06 RX ADMIN — SODIUM CHLORIDE 3 MILLILITER(S): 9 INJECTION INTRAMUSCULAR; INTRAVENOUS; SUBCUTANEOUS at 07:04

## 2017-12-06 RX ADMIN — SODIUM CHLORIDE 2000 MILLILITER(S): 9 INJECTION INTRAMUSCULAR; INTRAVENOUS; SUBCUTANEOUS at 07:04

## 2017-12-06 RX ADMIN — Medication 325 MILLIGRAM(S): at 17:50

## 2017-12-06 RX ADMIN — FAMOTIDINE 40 MILLIGRAM(S): 10 INJECTION INTRAVENOUS at 11:59

## 2017-12-06 RX ADMIN — OXYCODONE HYDROCHLORIDE 5 MILLIGRAM(S): 5 TABLET ORAL at 07:09

## 2017-12-06 RX ADMIN — OXYCODONE HYDROCHLORIDE 5 MILLIGRAM(S): 5 TABLET ORAL at 08:15

## 2017-12-06 RX ADMIN — TRAMADOL HYDROCHLORIDE 25 MILLIGRAM(S): 50 TABLET ORAL at 22:37

## 2017-12-06 RX ADMIN — OXYCODONE HYDROCHLORIDE 10 MILLIGRAM(S): 5 TABLET ORAL at 17:52

## 2017-12-06 RX ADMIN — Medication 650 MILLIGRAM(S): at 11:58

## 2017-12-06 RX ADMIN — GABAPENTIN 100 MILLIGRAM(S): 400 CAPSULE ORAL at 15:30

## 2017-12-06 RX ADMIN — POLYETHYLENE GLYCOL 3350 17 GRAM(S): 17 POWDER, FOR SOLUTION ORAL at 15:31

## 2017-12-06 RX ADMIN — Medication 650 MILLIGRAM(S): at 00:28

## 2017-12-06 RX ADMIN — Medication 100 MILLIGRAM(S): at 22:37

## 2017-12-06 RX ADMIN — SENNA PLUS 2 TABLET(S): 8.6 TABLET ORAL at 22:37

## 2017-12-06 RX ADMIN — Medication 100 MILLIGRAM(S): at 15:30

## 2017-12-06 RX ADMIN — OXYCODONE HYDROCHLORIDE 10 MILLIGRAM(S): 5 TABLET ORAL at 11:57

## 2017-12-06 RX ADMIN — Medication 15 MILLIGRAM(S): at 11:57

## 2017-12-06 RX ADMIN — Medication 20 MILLIGRAM(S): at 17:50

## 2017-12-06 RX ADMIN — SODIUM CHLORIDE 3 MILLILITER(S): 9 INJECTION INTRAMUSCULAR; INTRAVENOUS; SUBCUTANEOUS at 15:31

## 2017-12-06 RX ADMIN — TRAMADOL HYDROCHLORIDE 25 MILLIGRAM(S): 50 TABLET ORAL at 07:02

## 2017-12-06 RX ADMIN — SODIUM CHLORIDE 3 MILLILITER(S): 9 INJECTION INTRAMUSCULAR; INTRAVENOUS; SUBCUTANEOUS at 22:27

## 2017-12-06 RX ADMIN — Medication 650 MILLIGRAM(S): at 07:03

## 2017-12-06 RX ADMIN — Medication 2: at 12:50

## 2017-12-06 RX ADMIN — GABAPENTIN 100 MILLIGRAM(S): 400 CAPSULE ORAL at 22:37

## 2017-12-06 RX ADMIN — OXYCODONE HYDROCHLORIDE 10 MILLIGRAM(S): 5 TABLET ORAL at 12:40

## 2017-12-06 RX ADMIN — Medication 25 MILLIGRAM(S): at 07:02

## 2017-12-06 RX ADMIN — Medication 50 UNIT(S): at 22:37

## 2017-12-06 RX ADMIN — GABAPENTIN 100 MILLIGRAM(S): 400 CAPSULE ORAL at 07:03

## 2017-12-06 RX ADMIN — ATORVASTATIN CALCIUM 20 MILLIGRAM(S): 80 TABLET, FILM COATED ORAL at 22:37

## 2017-12-06 RX ADMIN — Medication 650 MILLIGRAM(S): at 18:51

## 2017-12-06 NOTE — DISCHARGE NOTE ADULT - CARE PLAN
Principal Discharge DX:	Osteoarthritis  Goal:	pain control, surgical site healing, ambulation, return to ADL's Principal Discharge DX:	Primary osteoarthritis of right knee  Goal:	pain control-> pain med may cause drowsiness, refrain from activities require decision making; physical therapy to help resume activities of daily living  Instructions for follow-up, activity and diet:	Office appointment is already made (see card attached to discharge folder) so if you need to reschedule please call Dr. Valladares's office [phone]  weight bearing as tolerated to Right leg

## 2017-12-06 NOTE — DISCHARGE NOTE ADULT - HOME CARE AGENCY
[facility]  /[facility] care  nurse/therapist to visit 12/8

## 2017-12-06 NOTE — DISCHARGE NOTE ADULT - MEDICATION SUMMARY - MEDICATIONS TO TAKE
I will START or STAY ON the medications listed below when I get home from the hospital:    Mobic 15 mg oral tablet  -- 1 tab(s) by mouth once a day (take with food)  -- Do not take this drug if you are pregnant.  Obtain medical advice before taking any non-prescription drugs as some may affect the action of this medication.  Take with food or milk.    -- Indication: For anti inflammatory    Percocet 5/325 oral tablet  -- 1-2 tab(s) by mouth every 6 hours as needed for moderate to severe pain  begin tapering off as pain decreases  MDD:EIGHT TABS  -- Caution federal law prohibits the transfer of this drug to any person other  than the person for whom it was prescribed.  May cause drowsiness.  Alcohol may intensify this effect.  Use care when operating dangerous machinery.  This prescription cannot be refilled.  This product contains acetaminophen.  Do not use  with any other product containing acetaminophen to prevent possible liver damage.  Using more of this medication than prescribed may cause serious breathing problems.    -- Indication: For Pain control    traMADol 50 mg oral tablet  -- 1 tab(s) by mouth 3 times a day take regularly (baseline control of mild pain)   begin tapering off as pain decreases  MDD:THREE TABS  -- Indication: For Pain control    aspirin 325 mg oral delayed release tablet  -- 1 tab(s) by mouth 2 times a day (with meals) x 42 days   -- Indication: For Blood thinner MUST TAKE WITH FOOD    enalapril 20 mg oral tablet  --  by mouth 2 times a day  -- Indication: For Home med    gabapentin 100 mg oral capsule  -- 1 cap(s) by mouth every 8 hours   begin tapering off as pain decreases  MDD:THREE CAPS  -- Indication: For Pain control    glimepiride 2 mg oral tablet  -- 1 tab(s) by mouth once a day AM  -- Indication: For Home med    metFORMIN 500 mg oral tablet  -- 1 tab(s) by mouth 2 times a day  -- Indication: For Home med    Levemir FlexPen 100 units/mL subcutaneous solution  -- 50 unit(s) subcutaneous once a day (at bedtime)  -- Indication: For Home med    Lipitor 20 mg oral tablet  -- 1 tab(s) by mouth once a day PM  -- Indication: For Home med    hydrochlorothiazide 25 mg oral tablet  -- 1 tab(s) by mouth once a day AM  -- Indication: For Home med    famotidine 40 mg oral tablet  -- 1 tab(s) by mouth once a day AM  -- Indication: For Home med    senna oral tablet  -- 2 tab(s) by mouth once a day (at bedtime)  over the counter for constipation from pain meds   -- Indication: For Stool stimulant    docusate sodium 100 mg oral capsule  -- 1 cap(s) by mouth 3 times a day  over the counter for constipation from pain meds   -- Indication: For Stool softener    omeprazole 20 mg oral delayed release capsule  -- 1 cap(s) by mouth once a day AM  -- Indication: For Home med TAKE WHILE ON ASPIRIN    Levoxyl 75 mcg (0.075 mg) oral tablet  -- 1 tab(s) by mouth once a day AM  -- Indication: For Home med

## 2017-12-06 NOTE — CONSULT NOTE ADULT - ASSESSMENT
Patient is a 71y old  Male who presents with a chief complaint of right total knee arthroplasty (06 Dec 2017 07:13).      Rt TKR:  WBAT  Pain control - Adequate.  DVT prophylaxis  BID  Bowel regimen.  Ortho f/up noted.    HLD:  Lipitor    GERD:  pepcid    HTN:  HCTZ/Enalapril    DM II:  FSSS/Levemir.    Hypothyroidism:  Synthroid.

## 2017-12-06 NOTE — DISCHARGE NOTE ADULT - PLAN OF CARE
pain control, surgical site healing, ambulation, return to ADL's pain control-> pain med may cause drowsiness, refrain from activities require decision making; physical therapy to help resume activities of daily living Office appointment is already made (see card attached to discharge folder) so if you need to reschedule please call Dr. Valladares's office [phone]  weight bearing as tolerated to Right leg

## 2017-12-06 NOTE — DISCHARGE NOTE ADULT - INSTRUCTIONS
no changes resume same diet as prior to surgery You have a post op appointment with Dr. Valladares on December 22, 2017 @ 8:15am in the [facility]. If you are unable to keep this appointment, please call the office to reschedule. Call MD if you develop a fever, or if there is redness, swelling, drainage or pain not relieved by pain medication. No heavy lifting, bending, or straining to move your bowels. Take over the counter stool softeners as needed to prevent constipation which may be caused by pain medication.

## 2017-12-06 NOTE — DISCHARGE NOTE ADULT - CONDITIONS AT DISCHARGE
Pt. is afebrile and offers no complaints. In no acute distress. Right  knee aquacel dressing: clean, dry and intact. Pt is ambulating with a walker, tolerating diet well, and voiding in adequate amounts.

## 2017-12-06 NOTE — CONSULT NOTE ADULT - SUBJECTIVE AND OBJECTIVE BOX
Patient is a 71y old  Male who presents with a chief complaint of right total knee arthroplasty (06 Dec 2017 07:13)      HPI:  72yo male with medical h/o HTN, HDL, Hypothyroidism, T2DM, Hearing impairment with bilateral hearing aid and OA, right knee. Pt presents today for presurgical evaluation for Total Knee Replacement, Right scheduled for 12/04/2017. (21 Nov 2017 07:42)      PAST MEDICAL & SURGICAL HISTORY:  Basal cell carcinoma  Osteoarthritis  Hyperlipidemia  Hypothyroidism  Hypertension  Diabetes mellitus type II, controlled  Cancer of tear duct, right  H/O left inguinal hernia repair  S/P Mohs surgery for basal cell carcinoma      Review of Systems:   CONSTITUTIONAL: No fever, weight loss, or fatigue  EYES: No eye pain, visual disturbances, or discharge  ENMT:  No difficulty hearing, tinnitus, vertigo; No sinus or throat pain  NECK: No pain or stiffness  RESPIRATORY: No cough, wheezing, chills or hemoptysis; No shortness of breath  CARDIOVASCULAR: No chest pain, palpitations, dizziness, or leg swelling  GASTROINTESTINAL: No abdominal or epigastric pain. No nausea, vomiting, or hematemesis; No diarrhea or constipation. No melena or hematochezia.  NEUROLOGICAL: No headaches, memory loss, loss of strength, numbness, or tremors  SKIN: No itching, burning, rashes, or lesions   MUSCULOSKELETAL: No joint pain or swelling; No muscle, back, or extremity pain  PSYCHIATRIC: No depression, anxiety, mood swings, or difficulty sleeping      Allergies    No Known Allergies    Intolerances        Social History:     FAMILY HISTORY:  No pertinent family history in first degree relatives      MEDICATIONS  (STANDING):  acetaminophen   Tablet. 650 milliGRAM(s) Oral every 6 hours  aspirin enteric coated 325 milliGRAM(s) Oral two times a day  atorvastatin 20 milliGRAM(s) Oral at bedtime  dextrose 5%. 1000 milliLiter(s) (50 mL/Hr) IV Continuous <Continuous>  dextrose 50% Injectable 12.5 Gram(s) IV Push once  dextrose 50% Injectable 25 Gram(s) IV Push once  dextrose 50% Injectable 25 Gram(s) IV Push once  docusate sodium 100 milliGRAM(s) Oral three times a day  enalapril 20 milliGRAM(s) Oral two times a day  famotidine    Tablet 40 milliGRAM(s) Oral daily  gabapentin 100 milliGRAM(s) Oral every 8 hours  hydrochlorothiazide 25 milliGRAM(s) Oral daily  insulin detemir injectable (LEVEMIR) 50 Unit(s) SubCutaneous at bedtime  insulin lispro (HumaLOG) corrective regimen sliding scale   SubCutaneous three times a day before meals  insulin lispro (HumaLOG) corrective regimen sliding scale   SubCutaneous at bedtime  lactated ringers. 1000 milliLiter(s) (150 mL/Hr) IV Continuous <Continuous>  levothyroxine 75 MICROGram(s) Oral daily  pantoprazole    Tablet 40 milliGRAM(s) Oral daily  polyethylene glycol 3350 17 Gram(s) Oral daily  senna 2 Tablet(s) Oral at bedtime  sodium chloride 0.9% lock flush 3 milliLiter(s) IV Push every 8 hours  traMADol 25 milliGRAM(s) Oral every 8 hours    MEDICATIONS  (PRN):  acetaminophen   Tablet 650 milliGRAM(s) Oral every 6 hours PRN For Temp over 38.3 C (100.94 F)  aluminum hydroxide/magnesium hydroxide/simethicone Suspension 30 milliLiter(s) Oral four times a day PRN Indigestion  dextrose Gel 1 Dose(s) Oral once PRN Blood Glucose LESS THAN 70 milliGRAM(s)/deciliter  glucagon  Injectable 1 milliGRAM(s) IntraMuscular once PRN Glucose LESS THAN 70 milligrams/deciliter  HYDROmorphone  Injectable 0.5 milliGRAM(s) IV Push every 4 hours PRN breakthrough pain  magnesium hydroxide Suspension 30 milliLiter(s) Oral daily PRN Constipation  ondansetron Injectable 4 milliGRAM(s) IV Push every 6 hours PRN Nausea and/or Vomiting  oxyCODONE    IR 5 milliGRAM(s) Oral every 4 hours PRN mild or moderate pain  oxyCODONE    IR 10 milliGRAM(s) Oral every 4 hours PRN Severe Pain (7 - 10)      CAPILLARY BLOOD GLUCOSE      POCT Blood Glucose.: 150 mg/dL (06 Dec 2017 16:58)  POCT Blood Glucose.: 162 mg/dL (06 Dec 2017 12:13)  POCT Blood Glucose.: 73 mg/dL (06 Dec 2017 08:26)  POCT Blood Glucose.: 205 mg/dL (05 Dec 2017 21:53)    I&O's Summary    05 Dec 2017 07:01  -  06 Dec 2017 07:00  --------------------------------------------------------  IN: 520 mL / OUT: 610 mL / NET: -90 mL    06 Dec 2017 07:01  -  06 Dec 2017 18:30  --------------------------------------------------------  IN: 0 mL / OUT: 120 mL / NET: -120 mL        PHYSICAL EXAM:  GENERAL: NAD, well-developed  HEAD:  Atraumatic, Normocephalic  NECK: Supple, No JVD  CHEST/LUNG: Clear to auscultation bilaterally; No wheezing.  HEART: Regular rate and rhythm; No murmurs, rubs, or gallops  ABDOMEN: Soft, Nontender, Nondistended; Bowel sounds present  EXTREMITIES:  2+ Peripheral Pulses, No clubbing, cyanosis, or edema  PSYCH: AAOx3  NEUROLOGY: non-focal  SKIN: No rashes or lesions    LABS:                        12.7   12.14 )-----------( 299      ( 06 Dec 2017 05:11 )             38.4     12-06    140  |  102  |  20  ----------------------------<  107<H>  4.2   |  25  |  0.74    Ca    8.8      06 Dec 2017 05:11              CAPILLARY BLOOD GLUCOSE      POCT Blood Glucose.: 150 mg/dL (06 Dec 2017 16:58)  POCT Blood Glucose.: 162 mg/dL (06 Dec 2017 12:13)  POCT Blood Glucose.: 73 mg/dL (06 Dec 2017 08:26)  POCT Blood Glucose.: 205 mg/dL (05 Dec 2017 21:53)                RADIOLOGY & ADDITIONAL TESTS:    Imaging Personally Reviewed:    Consultant(s) Notes Reviewed:      Care Discussed with Consultants/Other Providers:    Thanks for consult. Will follow.

## 2017-12-06 NOTE — PROGRESS NOTE ADULT - SUBJECTIVE AND OBJECTIVE BOX
Patient is seen and examined at bedside. Denies CP/SOB/Dizziness/N/V/D/HA. Pain is controlled.     Vital Signs Last 24 Hrs  T(C): 36.6 (06 Dec 2017 01:38), Max: 37.3 (05 Dec 2017 14:14)  T(F): 97.9 (06 Dec 2017 01:38), Max: 99.2 (05 Dec 2017 14:14)  HR: 79 (06 Dec 2017 01:38) (75 - 89)  BP: 106/54 (06 Dec 2017 01:38) (100/51 - 136/70)  BP(mean): --  RR: 16 (06 Dec 2017 01:38) (8 - 21)  SpO2: 97% (06 Dec 2017 01:38) (96% - 100%)  GEN: NAD     RLE: Dressing C/D/I.  HV in place  + TA EHL GS  SILT distally  Extremity warm.   Compartments are soft.     A/P: Patient is a 71y y/o Male s/p right total knee arthroplasty , POD # 1    -Pain control/analgesia  -Inc spirometry  -Venodynes/foot pumps  -F/U AM Labs  -PT/OT/WBAT  -Antibiotic - ancef  -Anticoagulation - ASA BID

## 2017-12-06 NOTE — DISCHARGE NOTE ADULT - NS AS DC FOLLOWUP STROKE INST
knee, exercise worksheet, ecotrin, mobic, percocet, tramadol/Influenza vaccination (VIS Pub Date: August 19, 2014)/Smoking Cessation

## 2017-12-06 NOTE — DISCHARGE NOTE ADULT - CARE PROVIDER_API CALL
John Valladares), Orthopaedic Surgery  [address] [address]  Phone: [phone]  Fax: [phone]

## 2017-12-06 NOTE — DISCHARGE NOTE ADULT - ADDITIONAL INSTRUCTIONS
post surgical care  73yo male is s/p Right total knee arthroplasty 12/5/2017 without any intraoperative complications.  Patient is doing well and stable for discharge.  Patient is tolerating physical therapy: weight bearing to Right leg, gait training, exercises as shown by Dr. Valladares and Physical Therapy.  If applicable, keep Aquacel dressing on as is until day of staple removal.  Staples/sutures to be removed 14 days from date of surgery.  Patient is on for anticoagulation.  Orthopaedic Surgeon Dr. Valladares would like patient to call private MD/Internist for appointment postop to maintain continuity of care.  Follow up with Dr. Valladares's office in 1-2 weeks.  Istop reviewed Reference #: [id number]

## 2017-12-06 NOTE — DISCHARGE NOTE ADULT - PATIENT PORTAL LINK FT
“You can access the [employer] Patient Portal, offered by [facility], by registering with the following website: [email or web address]”

## 2017-12-06 NOTE — DISCHARGE NOTE ADULT - HOSPITAL COURSE
72yo is s/p right total knee arthroplasty on 12/5/17 without any intraoperative complications.  Pt is doing well and stable for discharge.  Pt is tolerating physical therapy: WBAT with cane/walker if needed, gait training.  Leave Aquacel dressing on until post op office visit (POD#14). Hve sutures/staples removed POD#14 if present.  Pt is on enteric coated ASA 325mg PO BID for DVT prophylaxis, take for 6 weeks unless otherwise instructed by surgeon.  follow up with Dr. Valladares in two weeks. Follow up with primary care doctor in 1-2 weeks for continuity of care. 73yo male is s/p Right total knee arthroplasty 12/5/2017 without any intraoperative complications.  Patient is doing well and stable for discharge.  Patient is tolerating physical therapy: weight bearing to Right leg, gait training, exercises as shown by Dr. Valladares and Physical Therapy.  If applicable, keep Aquacel dressing on as is until day of staple removal.  Staples/sutures to be removed 14 days from date of surgery.  Patient is on for anticoagulation.  Orthopaedic Surgeon Dr. Valladares would like patient to call private MD/Internist for appointment postop to maintain continuity of care.  Follow up with Dr. Valladares's office in 1-2 weeks.    Istop reviewed Reference #: [id number]

## 2017-12-07 VITALS
DIASTOLIC BLOOD PRESSURE: 70 MMHG | OXYGEN SATURATION: 100 % | SYSTOLIC BLOOD PRESSURE: 127 MMHG | HEART RATE: 84 BPM | RESPIRATION RATE: 16 BRPM | TEMPERATURE: 98 F

## 2017-12-07 LAB
GLUCOSE BLDC GLUCOMTR-MCNC: 198 MG/DL — HIGH (ref 70–99)
GLUCOSE BLDC GLUCOMTR-MCNC: 65 MG/DL — LOW (ref 70–99)

## 2017-12-07 PROCEDURE — 99238 HOSP IP/OBS DSCHRG MGMT 30/<: CPT

## 2017-12-07 RX ORDER — MELOXICAM 15 MG/1
1 TABLET ORAL
Qty: 30 | Refills: 0 | OUTPATIENT
Start: 2017-12-07 | End: 2018-01-06

## 2017-12-07 RX ORDER — TRAMADOL HYDROCHLORIDE 50 MG/1
1 TABLET ORAL
Qty: 0 | Refills: 0 | COMMUNITY

## 2017-12-07 RX ORDER — ASPIRIN/CALCIUM CARB/MAGNESIUM 324 MG
1 TABLET ORAL
Qty: 84 | Refills: 0 | OUTPATIENT
Start: 2017-12-07 | End: 2018-01-18

## 2017-12-07 RX ORDER — SENNA PLUS 8.6 MG/1
2 TABLET ORAL
Qty: 0 | Refills: 0 | COMMUNITY
Start: 2017-12-07

## 2017-12-07 RX ORDER — DOCUSATE SODIUM 100 MG
1 CAPSULE ORAL
Qty: 0 | Refills: 0 | COMMUNITY
Start: 2017-12-07

## 2017-12-07 RX ORDER — GABAPENTIN 400 MG/1
1 CAPSULE ORAL
Qty: 45 | Refills: 0 | OUTPATIENT
Start: 2017-12-07 | End: 2017-12-22

## 2017-12-07 RX ORDER — TRAMADOL HYDROCHLORIDE 50 MG/1
1 TABLET ORAL
Qty: 45 | Refills: 0 | OUTPATIENT
Start: 2017-12-07 | End: 2017-12-22

## 2017-12-07 RX ADMIN — PANTOPRAZOLE SODIUM 40 MILLIGRAM(S): 20 TABLET, DELAYED RELEASE ORAL at 11:24

## 2017-12-07 RX ADMIN — Medication 100 MILLIGRAM(S): at 05:26

## 2017-12-07 RX ADMIN — CELECOXIB 200 MILLIGRAM(S): 200 CAPSULE ORAL at 07:51

## 2017-12-07 RX ADMIN — Medication 650 MILLIGRAM(S): at 07:55

## 2017-12-07 RX ADMIN — TRAMADOL HYDROCHLORIDE 25 MILLIGRAM(S): 50 TABLET ORAL at 07:52

## 2017-12-07 RX ADMIN — OXYCODONE HYDROCHLORIDE 10 MILLIGRAM(S): 5 TABLET ORAL at 09:46

## 2017-12-07 RX ADMIN — Medication 325 MILLIGRAM(S): at 05:26

## 2017-12-07 RX ADMIN — OXYCODONE HYDROCHLORIDE 10 MILLIGRAM(S): 5 TABLET ORAL at 10:30

## 2017-12-07 RX ADMIN — OXYCODONE HYDROCHLORIDE 10 MILLIGRAM(S): 5 TABLET ORAL at 06:15

## 2017-12-07 RX ADMIN — Medication 25 MILLIGRAM(S): at 05:26

## 2017-12-07 RX ADMIN — GABAPENTIN 100 MILLIGRAM(S): 400 CAPSULE ORAL at 07:52

## 2017-12-07 RX ADMIN — Medication 75 MICROGRAM(S): at 05:26

## 2017-12-07 RX ADMIN — OXYCODONE HYDROCHLORIDE 10 MILLIGRAM(S): 5 TABLET ORAL at 05:26

## 2017-12-07 RX ADMIN — Medication 20 MILLIGRAM(S): at 05:26

## 2017-12-07 RX ADMIN — Medication 650 MILLIGRAM(S): at 00:31

## 2017-12-07 RX ADMIN — FAMOTIDINE 40 MILLIGRAM(S): 10 INJECTION INTRAVENOUS at 11:24

## 2017-12-07 RX ADMIN — SODIUM CHLORIDE 3 MILLILITER(S): 9 INJECTION INTRAMUSCULAR; INTRAVENOUS; SUBCUTANEOUS at 05:27

## 2017-12-07 NOTE — PROGRESS NOTE ADULT - ASSESSMENT
Patient is a 71y old  Male who presents with a chief complaint of right total knee arthroplasty (06 Dec 2017 07:13).      Rt TKR:  WBAT  Pain control - Adequate.  DVT prophylaxis  BID  Bowel regimen.  Ortho f/up noted.    HLD:  Lipitor    GERD:  pepcid    HTN:  HCTZ/Enalapril    DM II:  FSSS/Levemir.    Hypothyroidism:  Synthroid.    Dc planning.

## 2017-12-07 NOTE — PROGRESS NOTE ADULT - SUBJECTIVE AND OBJECTIVE BOX
Patient is seen and examined at bedside. Denies CP/SOB/Dizziness/N/V/D/HA. Pain is controlled.     Vital Signs Last 24 Hrs  T(C): 36.4 (07 Dec 2017 05:23), Max: 37.2 (07 Dec 2017 01:07)  T(F): 97.5 (07 Dec 2017 05:23), Max: 99 (07 Dec 2017 01:07)  HR: 84 (07 Dec 2017 05:23) (81 - 88)  BP: 127/70 (07 Dec 2017 05:23) (104/66 - 145/77)  BP(mean): --  RR: 16 (07 Dec 2017 05:23) (16 - 18)  SpO2: 100% (07 Dec 2017 05:23) (95% - 100%)    GEN: NAD   RLE: Dressing C/D/I.  HV removed today  + TA EHL GS  SILT distally  Extremity warm.   Compartments are soft.     A/P: Patient is a 71y y/o Male s/p right total knee arthroplasty , POD # 2    -Pain control/analgesia  -Inc spirometry  -Venodynes/foot pumps  -PT/OT/WBAT  -Anticoagulation - ASA BID  -d/c plan today home

## 2017-12-07 NOTE — PROGRESS NOTE ADULT - SUBJECTIVE AND OBJECTIVE BOX
Patient is a 72y old  Male who presents with a chief complaint of elective Right total knee arthroplasty 12/5/2017 (06 Dec 2017 07:13)      SUBJECTIVE / OVERNIGHT EVENTS:   Feels better.  Denies CP/SOB/Palpitation/HA.    MEDICATIONS  (STANDING):  acetaminophen   Tablet. 650 milliGRAM(s) Oral every 6 hours  aspirin enteric coated 325 milliGRAM(s) Oral two times a day  atorvastatin 20 milliGRAM(s) Oral at bedtime  celecoxib 200 milliGRAM(s) Oral with breakfast  dextrose 5%. 1000 milliLiter(s) (50 mL/Hr) IV Continuous <Continuous>  dextrose 50% Injectable 12.5 Gram(s) IV Push once  dextrose 50% Injectable 25 Gram(s) IV Push once  dextrose 50% Injectable 25 Gram(s) IV Push once  docusate sodium 100 milliGRAM(s) Oral three times a day  enalapril 20 milliGRAM(s) Oral two times a day  famotidine    Tablet 40 milliGRAM(s) Oral daily  gabapentin 100 milliGRAM(s) Oral every 8 hours  hydrochlorothiazide 25 milliGRAM(s) Oral daily  insulin detemir injectable (LEVEMIR) 50 Unit(s) SubCutaneous at bedtime  insulin lispro (HumaLOG) corrective regimen sliding scale   SubCutaneous three times a day before meals  insulin lispro (HumaLOG) corrective regimen sliding scale   SubCutaneous at bedtime  lactated ringers. 1000 milliLiter(s) (150 mL/Hr) IV Continuous <Continuous>  levothyroxine 75 MICROGram(s) Oral daily  pantoprazole    Tablet 40 milliGRAM(s) Oral daily  polyethylene glycol 3350 17 Gram(s) Oral daily  senna 2 Tablet(s) Oral at bedtime  sodium chloride 0.9% lock flush 3 milliLiter(s) IV Push every 8 hours  traMADol 25 milliGRAM(s) Oral every 8 hours    MEDICATIONS  (PRN):  acetaminophen   Tablet 650 milliGRAM(s) Oral every 6 hours PRN For Temp over 38.3 C (100.94 F)  aluminum hydroxide/magnesium hydroxide/simethicone Suspension 30 milliLiter(s) Oral four times a day PRN Indigestion  bisacodyl Suppository 10 milliGRAM(s) Rectal daily PRN If no bowel movement by postoperative day #2  dextrose Gel 1 Dose(s) Oral once PRN Blood Glucose LESS THAN 70 milliGRAM(s)/deciliter  glucagon  Injectable 1 milliGRAM(s) IntraMuscular once PRN Glucose LESS THAN 70 milligrams/deciliter  HYDROmorphone  Injectable 0.5 milliGRAM(s) IV Push every 4 hours PRN breakthrough pain  magnesium hydroxide Suspension 30 milliLiter(s) Oral daily PRN Constipation  ondansetron Injectable 4 milliGRAM(s) IV Push every 6 hours PRN Nausea and/or Vomiting  oxyCODONE    IR 5 milliGRAM(s) Oral every 4 hours PRN mild or moderate pain  oxyCODONE    IR 10 milliGRAM(s) Oral every 4 hours PRN Severe Pain (7 - 10)        CAPILLARY BLOOD GLUCOSE      POCT Blood Glucose.: 198 mg/dL (07 Dec 2017 09:21)  POCT Blood Glucose.: 65 mg/dL (07 Dec 2017 08:26)  POCT Blood Glucose.: 185 mg/dL (06 Dec 2017 22:15)  POCT Blood Glucose.: 150 mg/dL (06 Dec 2017 16:58)    I&O's Summary    06 Dec 2017 07:01  -  07 Dec 2017 07:00  --------------------------------------------------------  IN: 0 mL / OUT: 265 mL / NET: -265 mL        PHYSICAL EXAM:  GENERAL: NAD, well-developed  HEAD:  Atraumatic, Normocephalic  NECK: Supple, No JVD  CHEST/LUNG: Clear to auscultation bilaterally; No wheezing.  HEART: Regular rate and rhythm; No murmurs, rubs, or gallops  ABDOMEN: Soft, Nontender, Nondistended; Bowel sounds present  EXTREMITIES:   No clubbing, cyanosis, or edema  NEUROLOGY: AAO X 3  SKIN: No rashes    LABS:                        12.7   12.14 )-----------( 299      ( 06 Dec 2017 05:11 )             38.4     12-06    140  |  102  |  20  ----------------------------<  107<H>  4.2   |  25  |  0.74    Ca    8.8      06 Dec 2017 05:11              CAPILLARY BLOOD GLUCOSE      POCT Blood Glucose.: 198 mg/dL (07 Dec 2017 09:21)  POCT Blood Glucose.: 65 mg/dL (07 Dec 2017 08:26)  POCT Blood Glucose.: 185 mg/dL (06 Dec 2017 22:15)  POCT Blood Glucose.: 150 mg/dL (06 Dec 2017 16:58)                RADIOLOGY & ADDITIONAL TESTS:    Imaging Personally Reviewed:    Consultant(s) Notes Reviewed:      Care Discussed with Consultants/Other Providers:

## 2017-12-08 ENCOUNTER — TRANSCRIPTION ENCOUNTER (OUTPATIENT)
Age: 72
End: 2017-12-08

## 2017-12-11 LAB — SURGICAL PATHOLOGY STUDY: SIGNIFICANT CHANGE UP

## 2017-12-12 ENCOUNTER — OTHER (OUTPATIENT)
Age: 72
End: 2017-12-12

## 2017-12-21 ENCOUNTER — APPOINTMENT (OUTPATIENT)
Dept: ORTHOPEDIC SURGERY | Facility: CLINIC | Age: 72
End: 2017-12-21
Payer: MEDICARE

## 2017-12-21 VITALS
HEART RATE: 100 BPM | SYSTOLIC BLOOD PRESSURE: 142 MMHG | BODY MASS INDEX: 25.47 KG/M2 | HEIGHT: 72 IN | DIASTOLIC BLOOD PRESSURE: 90 MMHG | WEIGHT: 188 LBS

## 2017-12-21 PROCEDURE — 99024 POSTOP FOLLOW-UP VISIT: CPT

## 2017-12-21 PROCEDURE — 73562 X-RAY EXAM OF KNEE 3: CPT | Mod: RT

## 2018-01-02 ENCOUNTER — RX RENEWAL (OUTPATIENT)
Age: 73
End: 2018-01-02

## 2018-01-19 ENCOUNTER — RX RENEWAL (OUTPATIENT)
Age: 73
End: 2018-01-19

## 2018-01-19 ENCOUNTER — OTHER (OUTPATIENT)
Age: 73
End: 2018-01-19

## 2018-02-02 ENCOUNTER — APPOINTMENT (OUTPATIENT)
Dept: ORTHOPEDIC SURGERY | Facility: CLINIC | Age: 73
End: 2018-02-02
Payer: MEDICARE

## 2018-02-02 VITALS
DIASTOLIC BLOOD PRESSURE: 77 MMHG | WEIGHT: 188 LBS | HEART RATE: 111 BPM | BODY MASS INDEX: 25.47 KG/M2 | SYSTOLIC BLOOD PRESSURE: 159 MMHG | HEIGHT: 72 IN

## 2018-02-02 PROCEDURE — 99024 POSTOP FOLLOW-UP VISIT: CPT

## 2018-03-12 ENCOUNTER — RX RENEWAL (OUTPATIENT)
Age: 73
End: 2018-03-12

## 2018-03-19 ENCOUNTER — RX RENEWAL (OUTPATIENT)
Age: 73
End: 2018-03-19

## 2018-05-21 ENCOUNTER — RX RENEWAL (OUTPATIENT)
Age: 73
End: 2018-05-21

## 2018-07-12 ENCOUNTER — RX RENEWAL (OUTPATIENT)
Age: 73
End: 2018-07-12

## 2018-07-17 ENCOUNTER — RX RENEWAL (OUTPATIENT)
Age: 73
End: 2018-07-17

## 2018-09-17 ENCOUNTER — RX RENEWAL (OUTPATIENT)
Age: 73
End: 2018-09-17

## 2018-09-24 PROBLEM — E11.9 TYPE 2 DIABETES MELLITUS WITHOUT COMPLICATIONS: Chronic | Status: ACTIVE | Noted: 2017-11-21

## 2018-09-24 PROBLEM — E03.9 HYPOTHYROIDISM, UNSPECIFIED: Chronic | Status: ACTIVE | Noted: 2017-11-21

## 2018-09-24 PROBLEM — M19.90 UNSPECIFIED OSTEOARTHRITIS, UNSPECIFIED SITE: Chronic | Status: ACTIVE | Noted: 2017-11-21

## 2018-09-24 PROBLEM — I10 ESSENTIAL (PRIMARY) HYPERTENSION: Chronic | Status: ACTIVE | Noted: 2017-11-21

## 2018-09-24 PROBLEM — E78.5 HYPERLIPIDEMIA, UNSPECIFIED: Chronic | Status: ACTIVE | Noted: 2017-11-21

## 2018-09-24 PROBLEM — C44.91 BASAL CELL CARCINOMA OF SKIN, UNSPECIFIED: Chronic | Status: ACTIVE | Noted: 2017-11-21

## 2018-10-05 ENCOUNTER — APPOINTMENT (OUTPATIENT)
Dept: ORTHOPEDIC SURGERY | Facility: CLINIC | Age: 73
End: 2018-10-05
Payer: MEDICARE

## 2018-10-05 VITALS
BODY MASS INDEX: 27.09 KG/M2 | HEART RATE: 74 BPM | HEIGHT: 72 IN | WEIGHT: 200 LBS | DIASTOLIC BLOOD PRESSURE: 91 MMHG | SYSTOLIC BLOOD PRESSURE: 151 MMHG

## 2018-10-05 DIAGNOSIS — M25.551 PAIN IN RIGHT HIP: ICD-10-CM

## 2018-10-05 DIAGNOSIS — Z96.651 PRESENCE OF RIGHT ARTIFICIAL KNEE JOINT: ICD-10-CM

## 2018-10-05 PROCEDURE — 99214 OFFICE O/P EST MOD 30 MIN: CPT

## 2018-10-05 PROCEDURE — 73502 X-RAY EXAM HIP UNI 2-3 VIEWS: CPT | Mod: RT

## 2018-10-05 PROCEDURE — 73562 X-RAY EXAM OF KNEE 3: CPT | Mod: RT

## 2018-11-14 ENCOUNTER — RX RENEWAL (OUTPATIENT)
Age: 73
End: 2018-11-14

## 2019-02-09 ENCOUNTER — RX RENEWAL (OUTPATIENT)
Age: 74
End: 2019-02-09

## 2019-04-08 ENCOUNTER — RX RENEWAL (OUTPATIENT)
Age: 74
End: 2019-04-08

## 2019-05-10 ENCOUNTER — CHART COPY (OUTPATIENT)
Age: 74
End: 2019-05-10

## 2019-06-07 ENCOUNTER — RX RENEWAL (OUTPATIENT)
Age: 74
End: 2019-06-07

## 2019-08-05 ENCOUNTER — RX RENEWAL (OUTPATIENT)
Age: 74
End: 2019-08-05

## 2019-09-30 ENCOUNTER — RX RENEWAL (OUTPATIENT)
Age: 74
End: 2019-09-30

## 2019-11-25 ENCOUNTER — RX RENEWAL (OUTPATIENT)
Age: 74
End: 2019-11-25

## 2019-11-26 ENCOUNTER — NON-APPOINTMENT (OUTPATIENT)
Age: 74
End: 2019-11-26

## 2019-11-26 ENCOUNTER — APPOINTMENT (OUTPATIENT)
Dept: CARDIOLOGY | Facility: CLINIC | Age: 74
End: 2019-11-26
Payer: MEDICARE

## 2019-11-26 VITALS
WEIGHT: 210 LBS | HEIGHT: 72 IN | SYSTOLIC BLOOD PRESSURE: 152 MMHG | OXYGEN SATURATION: 98 % | DIASTOLIC BLOOD PRESSURE: 90 MMHG | BODY MASS INDEX: 28.44 KG/M2 | HEART RATE: 79 BPM

## 2019-11-26 DIAGNOSIS — E10.11 TYPE 1 DIABETES MELLITUS WITH KETOACIDOSIS WITH COMA: ICD-10-CM

## 2019-11-26 DIAGNOSIS — Z87.891 PERSONAL HISTORY OF NICOTINE DEPENDENCE: ICD-10-CM

## 2019-11-26 DIAGNOSIS — Z82.49 FAMILY HISTORY OF ISCHEMIC HEART DISEASE AND OTHER DISEASES OF THE CIRCULATORY SYSTEM: ICD-10-CM

## 2019-11-26 PROCEDURE — 93000 ELECTROCARDIOGRAM COMPLETE: CPT

## 2019-11-26 PROCEDURE — 99204 OFFICE O/P NEW MOD 45 MIN: CPT

## 2019-11-26 RX ORDER — METFORMIN HYDROCHLORIDE 500 MG/1
500 TABLET, COATED ORAL TWICE DAILY
Qty: 180 | Refills: 3 | Status: ACTIVE | COMMUNITY

## 2019-11-26 RX ORDER — OMEPRAZOLE 20 MG/1
20 TABLET, DELAYED RELEASE ORAL
Refills: 0 | Status: ACTIVE | COMMUNITY

## 2019-11-26 RX ORDER — GLIMEPIRIDE 2 MG/1
2 TABLET ORAL
Refills: 0 | Status: ACTIVE | COMMUNITY

## 2019-11-26 RX ORDER — INSULIN DETEMIR 100 [IU]/ML
100 INJECTION, SOLUTION SUBCUTANEOUS
Refills: 0 | Status: ACTIVE | COMMUNITY

## 2019-11-26 RX ORDER — LEVOTHYROXINE SODIUM 75 UG/1
75 TABLET ORAL
Refills: 0 | Status: ACTIVE | COMMUNITY

## 2019-11-26 NOTE — HISTORY OF PRESENT ILLNESS
[FreeTextEntry1] : I saw Art Jackman in the office today for cardiac evaluation. He is a 73-year-old white male who is being treated for diabetes, hypertension, and hyperlipidemia. He has been athletic his whole life. He walks at least 3 miles a day and use a stationary bicycle and denies any exertional symptoms. He stopped smoking 50 years ago. His father  of a heart attack at age 72. He is on medication for his diabetes, hypertension, and hyperlipidemia.\par \par He has never had a cardiac evaluation. He has no history of known heart disease. He has had right knee replacement surgery.\par \par ECG shows sinus rhythm and is within normal limits. In addition to his medication he takes multiple vitamins. Blood work has been stable.

## 2019-11-26 NOTE — DISCUSSION/SUMMARY
[FreeTextEntry1] : This is a 73-year-old white male with diabetes, hypertension, hyperlipidemia all treated successfully with medication. According to the patient in your office his blood pressure runs about 128/70. He is nervous today.\par \par He exercises regularly and has no exertional symptoms. Because of his multiple risk factors, the patient will undergo a stress test to evaluate possible underlying coronary disease. Also schedule an echocardiogram to check on his heart structurally. He is having a carotid Doppler in your office. I would appreciate your sending a copy of that study as well as his most recent bloodwork for my review.\par \par Once I get the results of these tests back I will discuss them with the patient and decide if any change in medication or other testing would be appropriate.\par \par We did discuss the importance of lifestyle including diet, exercise, and weight control. He also knows to report any exertional symptoms if they occur.

## 2019-11-26 NOTE — REASON FOR VISIT
[Initial Evaluation] : an initial evaluation of [Hyperlipidemia] : hyperlipidemia [Hypertension] : hypertension [FreeTextEntry1] : Diabetes

## 2019-11-26 NOTE — PHYSICAL EXAM
[General Appearance - Well Developed] : well developed [Normal Appearance] : normal appearance [Well Groomed] : well groomed [General Appearance - Well Nourished] : well nourished [No Deformities] : no deformities [General Appearance - In No Acute Distress] : no acute distress [Normal Conjunctiva] : the conjunctiva exhibited no abnormalities [Normal Oral Mucosa] : normal oral mucosa [Normal Jugular Venous A Waves Present] : normal jugular venous A waves present [Normal Jugular Venous V Waves Present] : normal jugular venous V waves present [No Jugular Venous Escobedo A Waves] : no jugular venous escobedo A waves [Normal Rate] : normal [Normal S1] : normal S1 [Normal S2] : normal S2 [No Murmur] : no murmurs heard [2+] : left 2+ [1+] : left 1+ [No Abnormalities] : the abdominal aorta was not enlarged and no bruit was heard [No Pitting Edema] : no pitting edema present [Respiration, Rhythm And Depth] : normal respiratory rhythm and effort [Exaggerated Use Of Accessory Muscles For Inspiration] : no accessory muscle use [Auscultation Breath Sounds / Voice Sounds] : lungs were clear to auscultation bilaterally [Bowel Sounds] : normal bowel sounds [Abdomen Soft] : soft [Abdomen Tenderness] : non-tender [Nail Clubbing] : no clubbing of the fingernails [Cyanosis, Localized] : no localized cyanosis [Petechial Hemorrhages (___cm)] : no petechial hemorrhages [Skin Color & Pigmentation] : normal skin color and pigmentation [Skin Turgor] : normal skin turgor [] : no rash [Oriented To Time, Place, And Person] : oriented to person, place, and time [Impaired Insight] : insight and judgment were intact [No Anxiety] : not feeling anxious [Abnormal Walk] : normal gait [Gait - Sufficient For Exercise Testing] : the gait was sufficient for exercise testing [Not Palpable] : not palpable [S3] : no S3 [Left Carotid Bruit] : no bruit heard over the left carotid [S4] : no S4 [Right Carotid Bruit] : no bruit heard over the right carotid [Right Femoral Bruit] : no bruit heard over the right femoral artery [Left Femoral Bruit] : no bruit heard over the left femoral artery

## 2019-11-26 NOTE — REVIEW OF SYSTEMS
[Joint Pain] : joint pain [Finger Pain] : pain in the finger [Negative] : Genitourinary [Recent Weight Gain (___ Lbs)] : no recent weight gain [Fever] : no fever [Headache] : no headache [Chills] : no chills [Feeling Fatigued] : not feeling fatigued [Recent Weight Loss (___ Lbs)] : no recent weight loss [Blurry Vision] : no blurred vision [Seeing Double (Diplopia)] : no diplopia [Dizziness] : no dizziness [Skin: A Rash] : no rash: [Depression] : no depression [Anxiety] : no anxiety [Excessive Thirst] : no polydipsia [Easy Bleeding] : no tendency for easy bleeding [Easy Bruising] : no tendency for easy bruising

## 2019-12-12 ENCOUNTER — APPOINTMENT (OUTPATIENT)
Dept: CARDIOLOGY | Facility: CLINIC | Age: 74
End: 2019-12-12
Payer: MEDICARE

## 2019-12-12 PROCEDURE — 93306 TTE W/DOPPLER COMPLETE: CPT

## 2020-01-16 ENCOUNTER — APPOINTMENT (OUTPATIENT)
Dept: CARDIOLOGY | Facility: CLINIC | Age: 75
End: 2020-01-16
Payer: MEDICARE

## 2020-01-16 PROCEDURE — 93015 CV STRESS TEST SUPVJ I&R: CPT

## 2020-01-27 ENCOUNTER — APPOINTMENT (OUTPATIENT)
Dept: CARDIOLOGY | Facility: CLINIC | Age: 75
End: 2020-01-27
Payer: MEDICARE

## 2020-02-11 PROCEDURE — 93224 XTRNL ECG REC UP TO 48 HRS: CPT

## 2020-03-11 ENCOUNTER — RX CHANGE (OUTPATIENT)
Age: 75
End: 2020-03-11

## 2021-01-01 NOTE — ASU PATIENT PROFILE, ADULT - PMH
Basal cell carcinoma    Diabetes mellitus type II, controlled    Hyperlipidemia    Hypertension    Hypothyroidism    Osteoarthritis (2) well flexed

## 2021-03-05 ENCOUNTER — RX RENEWAL (OUTPATIENT)
Age: 76
End: 2021-03-05

## 2021-03-29 ENCOUNTER — RX RENEWAL (OUTPATIENT)
Age: 76
End: 2021-03-29

## 2021-04-01 ENCOUNTER — RX RENEWAL (OUTPATIENT)
Age: 76
End: 2021-04-01

## 2021-04-15 ENCOUNTER — APPOINTMENT (OUTPATIENT)
Dept: CARDIOLOGY | Facility: CLINIC | Age: 76
End: 2021-04-15
Payer: MEDICARE

## 2021-04-15 ENCOUNTER — NON-APPOINTMENT (OUTPATIENT)
Age: 76
End: 2021-04-15

## 2021-04-15 VITALS
OXYGEN SATURATION: 98 % | BODY MASS INDEX: 27.46 KG/M2 | HEIGHT: 74 IN | DIASTOLIC BLOOD PRESSURE: 84 MMHG | HEART RATE: 84 BPM | SYSTOLIC BLOOD PRESSURE: 161 MMHG | WEIGHT: 214 LBS

## 2021-04-15 PROCEDURE — 93000 ELECTROCARDIOGRAM COMPLETE: CPT

## 2021-04-15 PROCEDURE — 99214 OFFICE O/P EST MOD 30 MIN: CPT

## 2021-04-15 RX ORDER — EXENATIDE 2 MG
2 KIT SUBCUTANEOUS
Refills: 0 | Status: ACTIVE | COMMUNITY

## 2021-04-15 NOTE — HISTORY OF PRESENT ILLNESS
[FreeTextEntry1] : I saw Art Jackman in the office today for cardiac follow uo. He is a 75-year-old white male who is being treated for diabetes, hypertension, and hyperlipidemia. He has been athletic his whole life. He walks at least 3 miles a day and use a stationary bicycle and denies any exertional symptoms. He stopped smoking 50 years ago. His father  of a heart attack at age 72. He is on medication for his diabetes, hypertension, and hyperlipidemia.\par \par He has never had a cardiac evaluation. He has no history of known heart disease. He has had right knee replacement surgery.\par \par He had a carotid Doppler  that showed no plaque.  Echocardiogram showed an ejection fraction of 60%.  There was mild MR and TR with eccentric LVH.  Stage I diastolic dysfunction.  Stress test  showed no ischemia at workload of 6 METS.  4 minutes into exercise there was a 5 beat episode of wide-complex tachycardia.  Holter monitor demonstrated heart rates between 60 and 136 with an average of 81.  There were 2123 VPCs representing 1.8% of heartbeats.  54 couplets.  928 APCs, 32 couplets, and brief episodes of SVT.\par \par Blood work  demonstrated glucose of 159 with an A1c of 7.0.  Cholesterol 179, triglycerides 125, HDL 57, and LDL 97.\par \par ECG shows sinus rhythm and is within normal limits. In addition to his medication he takes multiple vitamins. Blood work has been stable.  Patient has been slowly gaining weight.

## 2021-04-15 NOTE — REASON FOR VISIT
[Follow-Up - Clinic] : a clinic follow-up of [Hyperlipidemia] : hyperlipidemia [Hypertension] : hypertension [FreeTextEntry1] : Diabetes

## 2021-04-15 NOTE — REVIEW OF SYSTEMS
[Joint Pain] : joint pain [Finger Pain] : pain in the finger [Negative] : Genitourinary [Fever] : no fever [Headache] : no headache [Recent Weight Gain (___ Lbs)] : no recent weight gain [Chills] : no chills [Feeling Fatigued] : not feeling fatigued [Recent Weight Loss (___ Lbs)] : no recent weight loss [Blurry Vision] : no blurred vision [Seeing Double (Diplopia)] : no diplopia [Skin: A Rash] : no rash: [Dizziness] : no dizziness [Depression] : no depression [Anxiety] : no anxiety [Excessive Thirst] : no polydipsia [Easy Bleeding] : no tendency for easy bleeding [Easy Bruising] : no tendency for easy bruising Unna Boot Text: An Unna boot was placed to help immobilize the limb and facilitate more rapid healing.

## 2021-04-15 NOTE — PHYSICAL EXAM
[General Appearance - Well Developed] : well developed [Normal Appearance] : normal appearance [Well Groomed] : well groomed [General Appearance - Well Nourished] : well nourished [No Deformities] : no deformities [General Appearance - In No Acute Distress] : no acute distress [Normal Conjunctiva] : the conjunctiva exhibited no abnormalities [Normal Oral Mucosa] : normal oral mucosa [Normal Jugular Venous A Waves Present] : normal jugular venous A waves present [Normal Jugular Venous V Waves Present] : normal jugular venous V waves present [No Jugular Venous Escobedo A Waves] : no jugular venous escobedo A waves [Respiration, Rhythm And Depth] : normal respiratory rhythm and effort [Exaggerated Use Of Accessory Muscles For Inspiration] : no accessory muscle use [Auscultation Breath Sounds / Voice Sounds] : lungs were clear to auscultation bilaterally [Bowel Sounds] : normal bowel sounds [Abdomen Soft] : soft [Abdomen Tenderness] : non-tender [Abnormal Walk] : normal gait [Gait - Sufficient For Exercise Testing] : the gait was sufficient for exercise testing [Nail Clubbing] : no clubbing of the fingernails [Cyanosis, Localized] : no localized cyanosis [Petechial Hemorrhages (___cm)] : no petechial hemorrhages [Skin Color & Pigmentation] : normal skin color and pigmentation [Skin Turgor] : normal skin turgor [] : no rash [Oriented To Time, Place, And Person] : oriented to person, place, and time [Impaired Insight] : insight and judgment were intact [No Anxiety] : not feeling anxious [Not Palpable] : not palpable [Normal Rate] : normal [Normal S1] : normal S1 [Normal S2] : normal S2 [No Murmur] : no murmurs heard [2+] : left 2+ [1+] : left 1+ [No Abnormalities] : the abdominal aorta was not enlarged and no bruit was heard [No Pitting Edema] : no pitting edema present [S3] : no S3 [S4] : no S4 [Right Carotid Bruit] : no bruit heard over the right carotid [Left Carotid Bruit] : no bruit heard over the left carotid [Right Femoral Bruit] : no bruit heard over the right femoral artery [Left Femoral Bruit] : no bruit heard over the left femoral artery

## 2021-04-15 NOTE — DISCUSSION/SUMMARY
[FreeTextEntry1] : Clinically the patient is doing well.  He remains active and has no chest pain, shortness of breath, or palpitation.  Needs to be more careful with his weight.  I am going to get his blood work from Dr. Morrison.  I had thought I would increase his Lipitor from 20 to 40 mg but he only taking 20 mg.  Blood pressure is borderline but in other doctor's offices his blood pressure seems to be good.  I therefore continue the present medication.\par \par Went over all his tests from last year including a stress test, echo and carotid Doppler, and I answered his questions.  If all is well I will see him in 6 months.

## 2021-11-02 ENCOUNTER — APPOINTMENT (OUTPATIENT)
Dept: CARDIOLOGY | Facility: CLINIC | Age: 76
End: 2021-11-02
Payer: MEDICARE

## 2021-11-02 VITALS
HEART RATE: 94 BPM | BODY MASS INDEX: 26.69 KG/M2 | SYSTOLIC BLOOD PRESSURE: 177 MMHG | WEIGHT: 208 LBS | OXYGEN SATURATION: 97 % | HEIGHT: 74 IN | DIASTOLIC BLOOD PRESSURE: 90 MMHG

## 2021-11-02 DIAGNOSIS — I47.2 VENTRICULAR TACHYCARDIA: ICD-10-CM

## 2021-11-02 PROCEDURE — 99214 OFFICE O/P EST MOD 30 MIN: CPT

## 2021-11-02 RX ORDER — VALSARTAN 320 MG/1
320 TABLET, COATED ORAL
Qty: 90 | Refills: 3 | Status: ACTIVE | COMMUNITY

## 2021-11-02 NOTE — DISCUSSION/SUMMARY
[FreeTextEntry1] : Clinically the patient is doing well.  He is active and has no chest pain, shortness of breath, palpitation.  According to the patient in other doctors offices his blood pressure is normal.  He gets nervous when he comes here.\par \par He will stay on his present medication.  We did go over his cardiac testing and I answered his questions.  We did discuss his lifestyle.  I will get a copy of his most recent blood work that was from about 6 months ago.  Assuming the blood work is okay and he feels well I would see him in 6 months.  He will make effort to try to lose additional weight.

## 2021-11-02 NOTE — PHYSICAL EXAM
[General Appearance - Well Developed] : well developed [Normal Appearance] : normal appearance [Well Groomed] : well groomed [General Appearance - Well Nourished] : well nourished [No Deformities] : no deformities [General Appearance - In No Acute Distress] : no acute distress [Normal Conjunctiva] : the conjunctiva exhibited no abnormalities [Normal Oral Mucosa] : normal oral mucosa [Normal Jugular Venous A Waves Present] : normal jugular venous A waves present [Normal Jugular Venous V Waves Present] : normal jugular venous V waves present [No Jugular Venous Escobedo A Waves] : no jugular venous escobedo A waves [Respiration, Rhythm And Depth] : normal respiratory rhythm and effort [Exaggerated Use Of Accessory Muscles For Inspiration] : no accessory muscle use [Auscultation Breath Sounds / Voice Sounds] : lungs were clear to auscultation bilaterally [Bowel Sounds] : normal bowel sounds [Abdomen Soft] : soft [Abdomen Tenderness] : non-tender [Abnormal Walk] : normal gait [Gait - Sufficient For Exercise Testing] : the gait was sufficient for exercise testing [Nail Clubbing] : no clubbing of the fingernails [Cyanosis, Localized] : no localized cyanosis [Petechial Hemorrhages (___cm)] : no petechial hemorrhages [Skin Color & Pigmentation] : normal skin color and pigmentation [Skin Turgor] : normal skin turgor [] : no rash [Impaired Insight] : insight and judgment were intact [Oriented To Time, Place, And Person] : oriented to person, place, and time [No Anxiety] : not feeling anxious [Not Palpable] : not palpable [Normal Rate] : normal [Normal S1] : normal S1 [Normal S2] : normal S2 [No Murmur] : no murmurs heard [2+] : left 2+ [1+] : left 1+ [No Abnormalities] : the abdominal aorta was not enlarged and no bruit was heard [No Pitting Edema] : no pitting edema present [S3] : no S3 [S4] : no S4 [Right Carotid Bruit] : no bruit heard over the right carotid [Left Carotid Bruit] : no bruit heard over the left carotid [Right Femoral Bruit] : no bruit heard over the right femoral artery [Left Femoral Bruit] : no bruit heard over the left femoral artery

## 2021-11-02 NOTE — HISTORY OF PRESENT ILLNESS
[FreeTextEntry1] : I saw Art Jackman in the office today for cardiac follow uo. He is a 75-year-old white male who is being treated for diabetes, hypertension, and hyperlipidemia. He has been athletic his whole life. He walks at least 3 miles a day and use a stationary bicycle and denies any exertional symptoms. He stopped smoking 50 years ago. His father  of a heart attack at age 72. He is on medication for his diabetes, hypertension, and hyperlipidemia.\par \par He has never had a cardiac evaluation. He has no history of known heart disease. He has had right knee replacement surgery.\par \par He had a carotid Doppler  that showed no plaque.  Echocardiogram showed an ejection fraction of 60%.  There was mild MR and TR with eccentric LVH.  Stage I diastolic dysfunction.  Stress test  showed no ischemia at workload of 6 METS.  4 minutes into exercise there was a 5 beat episode of wide-complex tachycardia.  Holter monitor demonstrated heart rates between 60 and 136 with an average of 81.  There were 2123 VPCs representing 1.8% of heartbeats.  54 couplets.  928 APCs, 32 couplets, and brief episodes of SVT.\par \par Blood work  demonstrated glucose of 159 with an A1c of 7.0.  Cholesterol 179, triglycerides 125, HDL 57, and LDL 97.  Last visit I increased his Lipitor to 40 mg a day.  According to the patient his blood work has been stable.\par \par Patient exercises regularly and does about 10,000 steps a day.  He has no symptoms.  He is trying to lose weight and has managed to lose a few pounds since his last visit.  His goal is to get 285 pounds.

## 2021-11-02 NOTE — REVIEW OF SYSTEMS
[Joint Pain] : joint pain [Finger Pain] : pain in the finger [Negative] : Genitourinary [Rash] : no rash [Dizziness] : no dizziness [Depression] : no depression [Anxiety] : no anxiety [Easy Bleeding] : no tendency for easy bleeding [Easy Bruising] : no tendency for easy bruising

## 2022-03-15 ENCOUNTER — RX RENEWAL (OUTPATIENT)
Age: 77
End: 2022-03-15

## 2022-05-17 ENCOUNTER — APPOINTMENT (OUTPATIENT)
Dept: CARDIOLOGY | Facility: CLINIC | Age: 77
End: 2022-05-17
Payer: MEDICARE

## 2022-05-17 ENCOUNTER — NON-APPOINTMENT (OUTPATIENT)
Age: 77
End: 2022-05-17

## 2022-05-17 VITALS
HEART RATE: 92 BPM | BODY MASS INDEX: 26.95 KG/M2 | SYSTOLIC BLOOD PRESSURE: 184 MMHG | OXYGEN SATURATION: 97 % | DIASTOLIC BLOOD PRESSURE: 97 MMHG | HEIGHT: 74 IN | WEIGHT: 210 LBS

## 2022-05-17 DIAGNOSIS — E78.5 HYPERLIPIDEMIA, UNSPECIFIED: ICD-10-CM

## 2022-05-17 DIAGNOSIS — E11.9 TYPE 2 DIABETES MELLITUS W/OUT COMPLICATIONS: ICD-10-CM

## 2022-05-17 PROCEDURE — 93000 ELECTROCARDIOGRAM COMPLETE: CPT

## 2022-05-17 PROCEDURE — 99214 OFFICE O/P EST MOD 30 MIN: CPT

## 2022-05-17 RX ORDER — ATORVASTATIN CALCIUM 40 MG/1
40 TABLET, FILM COATED ORAL
Qty: 90 | Refills: 3 | Status: ACTIVE | COMMUNITY

## 2022-05-17 NOTE — PHYSICAL EXAM
[General Appearance - Well Developed] : well developed [Normal Appearance] : normal appearance [Well Groomed] : well groomed [General Appearance - Well Nourished] : well nourished [No Deformities] : no deformities [General Appearance - In No Acute Distress] : no acute distress [Normal Conjunctiva] : the conjunctiva exhibited no abnormalities [Normal Oral Mucosa] : normal oral mucosa [Normal Jugular Venous A Waves Present] : normal jugular venous A waves present [Normal Jugular Venous V Waves Present] : normal jugular venous V waves present [No Jugular Venous Escobedo A Waves] : no jugular venous escobedo A waves [Respiration, Rhythm And Depth] : normal respiratory rhythm and effort [Exaggerated Use Of Accessory Muscles For Inspiration] : no accessory muscle use [Auscultation Breath Sounds / Voice Sounds] : lungs were clear to auscultation bilaterally [Bowel Sounds] : normal bowel sounds [Abdomen Soft] : soft [Abdomen Tenderness] : non-tender [Gait - Sufficient For Exercise Testing] : the gait was sufficient for exercise testing [Abnormal Walk] : normal gait [Nail Clubbing] : no clubbing of the fingernails [Cyanosis, Localized] : no localized cyanosis [Petechial Hemorrhages (___cm)] : no petechial hemorrhages [Skin Color & Pigmentation] : normal skin color and pigmentation [Skin Turgor] : normal skin turgor [] : no rash [Oriented To Time, Place, And Person] : oriented to person, place, and time [Impaired Insight] : insight and judgment were intact [No Anxiety] : not feeling anxious [Not Palpable] : not palpable [Normal Rate] : normal [Normal S1] : normal S1 [Normal S2] : normal S2 [No Murmur] : no murmurs heard [2+] : left 2+ [1+] : left 1+ [No Abnormalities] : the abdominal aorta was not enlarged and no bruit was heard [No Pitting Edema] : no pitting edema present [S3] : no S3 [S4] : no S4 [Right Carotid Bruit] : no bruit heard over the right carotid [Left Carotid Bruit] : no bruit heard over the left carotid [Right Femoral Bruit] : no bruit heard over the right femoral artery [Left Femoral Bruit] : no bruit heard over the left femoral artery

## 2022-05-17 NOTE — HISTORY OF PRESENT ILLNESS
[FreeTextEntry1] : I saw Art Jackman in the office today for cardiac follow uo. He is a 76-year-old white male who is being treated for diabetes, hypertension, and hyperlipidemia. He has been athletic his whole life. He walks at least 3 miles a day and use a stationary bicycle and denies any exertional symptoms. He stopped smoking 50 years ago. His father  of a heart attack at age 72. He is on medication for his diabetes, hypertension, and hyperlipidemia.\par \par He has never had a cardiac evaluation. He has no history of known heart disease. He has had right knee replacement surgery.\par \[facility] He had a carotid Doppler  that showed no plaque.  Echocardiogram showed an ejection fraction of 60%.  There was mild MR and TR with eccentric LVH.  Stage I diastolic dysfunction.  Stress test  showed no ischemia at workload of 6 METS.  4 minutes into exercise there was a 5 beat episode of wide-complex tachycardia.  Holter monitor demonstrated heart rates between 60 and 136 with an average of 81.  There were 2123 VPCs representing 1.8% of heartbeats.  54 couplets.  928 APCs, 32 couplets, and brief episodes of SVT.  I discussed the case with Dr. Barrow, the electrophysiologist at [place].  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  Cholesterol 121, triglycerides 59, HDL 50, and LDL 58..\par \par Patient exercises regularly and does about 10,000 steps a day.  He has no symptoms.  He is trying to lose weight.  His weight has been relatively stable.  \par \[facility] ECG demonstrates sinus rhythm and remains normal.

## 2022-05-17 NOTE — DISCUSSION/SUMMARY
[FreeTextEntry1] : Patient continues to do well.  At home he monitors his blood pressure gets excellent readings.  He remains physically active.  He works 3 days a week and babysits his grandchildren the other days.  He has no chest pain, shortness of breath, palpitation.\par \par We went  over his medications, his blood work from last year, his lifestyle.  I answered all of his questions.  He is going for blood work with his endocrinologist Dr. Talavera  and will send us a copy.  Assuming his blood work is good and he feels well we will see him again in 1 year.  Knows to call the office if any problems arise.

## 2022-05-28 ENCOUNTER — NON-APPOINTMENT (OUTPATIENT)
Age: 77
End: 2022-05-28

## 2023-02-04 NOTE — PACU DISCHARGE NOTE - THE ANESTHESIA ORDERS USED IN THE PACU ORDER SET WILL BE DISCONTINUED UPON TRANSFER OF THIS PATIENT
Skilled reason for visit: abdomen open incison wound care  Caregiver involvement: family for assistance as needed. Medications reviewed and all medications are available in the home this visit. The following education was provided regarding medications, medication interactions, and look alike medications (specify): no med changes noted or reported. Medications  are effective at this time. Home health supplies by type and quantity ordered/delivered this visit include: none  Patient education provided this visit:standard precautions for infection control are utilized  patient to continue to take medications as prescribed. patient aware to monitor for effectiveness and to notify staff of any adverse reactions to medications/any changes to medication regimen. Progress toward goals: wound care adminstered and tolerated well by pt with no pain or distress  Home exercise program: due to having a abdomen wound risk of fall is hier. Patient was instructed on strategies that can significantly help decrease the risk of a fall such as: Good lighting throughout the home, especially in stairwells and hallways, Non-slip floors and rugs, Hand rails on stairs, next to the toilet and in the shower and bathtub.     Continued need for the following skills: Nursing  Patient and/or caregiver notified and agrees to changes in the Plan of Care YES/NO/NA: N/A    Patient will be discharged once education and wounds if applicable has been completed, patient is medically stable, and all goals met
Statement Selected

## 2023-02-19 NOTE — PATIENT PROFILE ADULT. - NSTOBACCONEVERSMOKERY/N_GEN_A
"  History     Chief Complaint   Patient presents with     chest congestion     Mom concerned about chest congestion, notes needs to \"force himself to cough\"     Pharyngitis     C/o sore throat     HPI  Krishna Cid is a 5 year old male who presents to urgent care with mother for evaluation of sore throat and cough.  Mother states that the symptoms started this morning.  She denies any fever, shortness of breath, vomiting, diarrhea, or any other associated symptoms.    Allergies:  No Known Allergies    Problem List:    Patient Active Problem List    Diagnosis Date Noted     Speech delay 2020     Priority: Medium     Term birth of  male 2017     Priority: Medium        Past Medical History:    No past medical history on file.    Past Surgical History:    No past surgical history on file.    Family History:    Family History   Problem Relation Age of Onset     Substance Abuse Maternal Grandfather         Alcohol       Social History:  Marital Status:  Single [1]  Social History     Tobacco Use     Smoking status: Never     Smokeless tobacco: Never   Vaping Use     Vaping Use: Never used   Substance Use Topics     Alcohol use: Never     Drug use: Never        Medications:    acetaminophen (TYLENOL) 32 mg/mL liquid          Review of Systems   Constitutional: Negative for fever.   HENT: Positive for sore throat.    Respiratory: Positive for cough.    All other systems reviewed and are negative.      Physical Exam   Pulse: 120  Temp: 98.8  F (37.1  C)  Resp: 18  Weight: 17.8 kg (39 lb 3.9 oz)  SpO2: 99 %      Physical Exam  Vitals and nursing note reviewed.   Constitutional:       General: He is not in acute distress.     Appearance: He is well-developed. He is not ill-appearing.   HENT:      Right Ear: Tympanic membrane normal.      Left Ear: Tympanic membrane normal.      Nose: No congestion or rhinorrhea.      Mouth/Throat:      Mouth: Mucous membranes are pale. No oral lesions.      Pharynx: No " pharyngeal swelling, oropharyngeal exudate or posterior oropharyngeal erythema.      Tonsils: No tonsillar exudate.   Eyes:      Conjunctiva/sclera: Conjunctivae normal.      Pupils: Pupils are equal, round, and reactive to light.   Cardiovascular:      Rate and Rhythm: Normal rate and regular rhythm.      Heart sounds: Normal heart sounds.   Lymphadenopathy:      Cervical: No cervical adenopathy.   Neurological:      Mental Status: He is alert.         ED Course                 Procedures             Critical Care time:               Results for orders placed or performed during the hospital encounter of 02/19/23 (from the past 24 hour(s))   Group A Streptococcus PCR Throat Swab    Specimen: Throat; Swab   Result Value Ref Range    Group A strep by PCR Not Detected Not Detected    Narrative    The Xpert Xpress Strep A test, performed on the [employer] Systems, is a rapid, qualitative in vitro diagnostic test for the detection of Streptococcus pyogenes (Group A ß-hemolytic Streptococcus, Strep A) in throat swab specimens from patients with signs and symptoms of pharyngitis. The Xpert Xpress Strep A test can be used as an aid in the diagnosis of Group A Streptococcal pharyngitis. The assay is not intended to monitor treatment for Group A Streptococcus infections. The Xpert Xpress Strep A test utilizes an automated real-time polymerase chain reaction (PCR) to detect Streptococcus pyogenes DNA.       Medications - No data to display    Assessments & Plan (with Medical Decision Making)   #1.  URI    Discussed exam findings with patient's mother and reassurance is given.  Patient does have strep test pending will be notified of results.  Tylenol or ibuprofen as directed for fever or pain.  Supportive cares discussed with mother.  Any significant shortness of breath patient should return to emergency department immediately.  Any additional concerns patient can return to urgent care or follow-up with primary  care provider.  Mother verbalized understanding and agreement of plan.    I have reviewed the nursing notes.    I have reviewed the findings, diagnosis, plan and need for follow up with the patient.        Discharge Medication List as of 2/19/2023  2:44 PM          Final diagnoses:   URI (upper respiratory infection)       2/19/2023   [facility]     Roman Martin PA-C  02/19/23 1447     No

## 2023-02-20 NOTE — DISCHARGE NOTE ADULT - CAREGIVER RELATION TO PATIENT
How Severe Is Your Skin Lesion?: mild Has Your Skin Lesion Been Treated?: not been treated Is This A New Presentation, Or A Follow-Up?: Skin Lesions spouse

## 2023-03-10 ENCOUNTER — RX RENEWAL (OUTPATIENT)
Age: 78
End: 2023-03-10

## 2023-04-24 ENCOUNTER — APPOINTMENT (OUTPATIENT)
Dept: CARDIOLOGY | Facility: CLINIC | Age: 78
End: 2023-04-24
Payer: MEDICARE

## 2023-04-24 ENCOUNTER — NON-APPOINTMENT (OUTPATIENT)
Age: 78
End: 2023-04-24

## 2023-04-24 VITALS
OXYGEN SATURATION: 98 % | SYSTOLIC BLOOD PRESSURE: 154 MMHG | BODY MASS INDEX: 38.83 KG/M2 | HEIGHT: 62 IN | WEIGHT: 211 LBS | HEART RATE: 73 BPM | DIASTOLIC BLOOD PRESSURE: 82 MMHG

## 2023-04-24 DIAGNOSIS — I10 ESSENTIAL (PRIMARY) HYPERTENSION: ICD-10-CM

## 2023-04-24 DIAGNOSIS — I77.819 AORTIC ECTASIA, UNSPECIFIED SITE: ICD-10-CM

## 2023-04-24 DIAGNOSIS — E78.5 HYPERLIPIDEMIA, UNSPECIFIED: ICD-10-CM

## 2023-04-24 PROCEDURE — 93000 ELECTROCARDIOGRAM COMPLETE: CPT

## 2023-04-24 PROCEDURE — 99214 OFFICE O/P EST MOD 30 MIN: CPT

## 2023-04-24 RX ORDER — EMPAGLIFLOZIN 25 MG/1
TABLET, FILM COATED ORAL
Refills: 0 | Status: ACTIVE | COMMUNITY

## 2023-04-24 NOTE — DISCUSSION/SUMMARY
[FreeTextEntry1] : Patient continues to do well.  His blood pressure has been well controlled at home.  He remains physically active.  He works 3 days a week and babysits his grandchildren the other days.  He has no chest pain, shortness of breath or palpitation.\par \par His last echocardiogram in 2019 did demonstrate eccentric LVH, and mild aortic root dilatation, and I am going to repeat a study this year.  His cholesterol has been at goal, on a statin, which he will continue.\par \par He will remain on his current blood pressure regimen.  I have requested a copy of his most recent blood work.  If no issues, I will see him again in 1 year.\par  [EKG obtained to assist in diagnosis and management of assessed problem(s)] : EKG obtained to assist in diagnosis and management of assessed problem(s)

## 2023-04-24 NOTE — HISTORY OF PRESENT ILLNESS
[FreeTextEntry1] : I saw Art Jackman in the office today for cardiac follow up. \par \par He last saw Dr. Silver in .\par \par He is a 77-year-old white male who is being treated for diabetes, hypertension, and hyperlipidemia. He has been athletic his whole life. He stopped smoking 50 years ago. His father  of a heart attack at age 72. He is on medication for his diabetes, hypertension, and hyperlipidemia.\par \par Stress test  showed no ischemia at workload of 6 METS.  4 minutes into exercise there was a 5 beat episode of wide-complex tachycardia.  Holter monitor demonstrated heart rates between 60 and 136 with an average of 81.  There were 2123 VPCs representing 1.8% of heartbeats.  54 couplets.  928 APCs, 32 couplets, and brief episodes of SVT. 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  He continues to work, and is constantly moving around. Patient exercises regularly and does about 10,000 steps a day.  He has no symptoms.  He is trying to lose weight.  His only new medication since last visit is Jardiance. \par He reports a history of stress/reactive hypertension office, and reports numbers in the 120-130 range at home.

## 2023-04-24 NOTE — CARDIOLOGY SUMMARY
[de-identified] : Echocardiogram showed an ejection fraction of 60%.  There was mild MR and TR with eccentric LVH.  Stage I diastolic dysfunction [de-identified] : carotid Doppler 12/19 that showed no plaque.

## 2023-04-26 ENCOUNTER — APPOINTMENT (OUTPATIENT)
Dept: CARDIOLOGY | Facility: CLINIC | Age: 78
End: 2023-04-26
Payer: MEDICARE

## 2023-04-26 PROCEDURE — 93306 TTE W/DOPPLER COMPLETE: CPT

## 2023-06-06 ENCOUNTER — RX RENEWAL (OUTPATIENT)
Age: 78
End: 2023-06-06

## 2023-07-20 NOTE — PHYSICAL THERAPY INITIAL EVALUATION ADULT - BED MOBILITY LIMITATIONS, REHAB EVAL
decreased ability to use arms for pushing/pulling/decreased ability to use legs for bridging/pushing Drysol Counseling:  I discussed with the patient the risks of drysol/aluminum chloride including but not limited to skin rash, itching, irritation, burning.

## 2023-09-15 ASSESSMENT — KOOS JR
IMPORTED LATERALITY: RIGHT
KOOS JR RAW SCORE: 15
STRAIGHTENING KNEE FULLY: MODERATE
STANDING UPRIGHT: MODERATE
KOOS JR RAW SCORE: 15
BENDING TO THE FLOOR TO PICK UP OBJECT: MODERATE
STRAIGHTENING KNEE FULLY: MILD
TWISING OR PIVOTING ON KNEE: MILD
IMPORTED KOOS JR SCORE: 42.28
TWISING OR PIVOTING ON KNEE: SEVERE
KOOS JR RAW SCORE: 18
STANDING UPRIGHT: MILD
GOING UP OR DOWN STAIRS: MILD
GOING UP OR DOWN STAIRS: SEVERE
IMPORTED FORM: YES
RISING FROM SITTING: SEVERE
IMPORTED FORM: YES
GOING UP OR DOWN STAIRS: MODERATE
IMPORTED KOOS JR SCORE: 65.99
TWISING OR PIVOTING ON KNEE: SEVERE
STANDING UPRIGHT: MODERATE
KOOS JR RAW SCORE: 18
RISING FROM SITTING: SEVERE
STANDING UPRIGHT: MODERATE
GOING UP OR DOWN STAIRS: MODERATE
RISING FROM SITTING: MODERATE
STRAIGHTENING KNEE FULLY: MODERATE
GOING UP OR DOWN STAIRS: SEVERE
RISING FROM SITTING: MILD
RISING FROM SITTING: MODERATE
GOING UP OR DOWN STAIRS: MILD
BENDING TO THE FLOOR TO PICK UP OBJECT: MILD
RISING FROM SITTING: MODERATE
IMPORTED KOOS JR SCORE: 50.01
STRAIGHTENING KNEE FULLY: MILD
BENDING TO THE FLOOR TO PICK UP OBJECT: MODERATE
GOING UP OR DOWN STAIRS: MODERATE
HOW SEVERE IS YOUR KNEE STIFFNESS AFTER FIRST WAKING IN MORNING: MODERATE
TWISING OR PIVOTING ON KNEE: SEVERE
IMPORTED KOOS JR SCORE: 42.28
HOW SEVERE IS YOUR KNEE STIFFNESS AFTER FIRST WAKING IN MORNING: MODERATE
IMPORTED KOOS JR SCORE: 65.99
STANDING UPRIGHT: MILD
HOW SEVERE IS YOUR KNEE STIFFNESS AFTER FIRST WAKING IN MORNING: MODERATE
STRAIGHTENING KNEE FULLY: SEVERE
STRAIGHTENING KNEE FULLY: SEVERE
BENDING TO THE FLOOR TO PICK UP OBJECT: MODERATE
HOW SEVERE IS YOUR KNEE STIFFNESS AFTER FIRST WAKING IN MORNING: MILD
IMPORTED LATERALITY: RIGHT
KOOS JR RAW SCORE: 8
HOW SEVERE IS YOUR KNEE STIFFNESS AFTER FIRST WAKING IN MORNING: MILD
IMPORTED FORM: YES
RISING FROM SITTING: MILD
IMPORTED KOOS JR SCORE: 50.01
TWISING OR PIVOTING ON KNEE: MILD
BENDING TO THE FLOOR TO PICK UP OBJECT: MILD
KOOS JR RAW SCORE: 8
IMPORTED LATERALITY: RIGHT

## 2024-02-12 ENCOUNTER — RX RENEWAL (OUTPATIENT)
Age: 79
End: 2024-02-12

## 2024-02-12 RX ORDER — METOPROLOL SUCCINATE 25 MG/1
25 TABLET, EXTENDED RELEASE ORAL
Qty: 90 | Refills: 3 | Status: ACTIVE | COMMUNITY
Start: 2021-04-01 | End: 1900-01-01

## 2024-02-12 RX ORDER — HYDROCHLOROTHIAZIDE 12.5 MG/1
12.5 TABLET ORAL DAILY
Qty: 90 | Refills: 2 | Status: ACTIVE | COMMUNITY
Start: 2023-03-10 | End: 1900-01-01

## 2024-03-26 ENCOUNTER — NON-APPOINTMENT (OUTPATIENT)
Age: 79
End: 2024-03-26

## 2024-03-26 DIAGNOSIS — L60.8 OTHER NAIL DISORDERS: ICD-10-CM

## 2024-03-26 DIAGNOSIS — M79.674 PAIN IN RIGHT TOE(S): ICD-10-CM

## 2024-03-26 DIAGNOSIS — M79.675 PAIN IN LEFT TOE(S): ICD-10-CM

## 2024-03-26 DIAGNOSIS — L84 CORNS AND CALLOSITIES: ICD-10-CM

## 2024-03-26 DIAGNOSIS — M79.672 PAIN IN LEFT FOOT: ICD-10-CM

## 2024-04-11 ENCOUNTER — APPOINTMENT (OUTPATIENT)
Dept: PODIATRY | Facility: CLINIC | Age: 79
End: 2024-04-11
Payer: MEDICARE

## 2024-04-11 DIAGNOSIS — M79.671 PAIN IN RIGHT FOOT: ICD-10-CM

## 2024-04-11 DIAGNOSIS — L89.891 PRESSURE ULCER OF OTHER SITE, STAGE 1: ICD-10-CM

## 2024-04-11 DIAGNOSIS — I70.209 UNSPECIFIED ATHEROSCLEROSIS OF NATIVE ARTERIES OF EXTREMITIES, UNSPECIFIED EXTREMITY: ICD-10-CM

## 2024-04-11 DIAGNOSIS — B35.1 TINEA UNGUIUM: ICD-10-CM

## 2024-04-11 PROCEDURE — 99213 OFFICE O/P EST LOW 20 MIN: CPT | Mod: 25

## 2024-04-11 PROCEDURE — 11720 DEBRIDE NAIL 1-5: CPT | Mod: 59,Q8

## 2024-04-11 PROCEDURE — 97597 DBRDMT OPN WND 1ST 20 CM/<: CPT | Mod: LT

## 2024-04-11 PROCEDURE — 99203 OFFICE O/P NEW LOW 30 MIN: CPT | Mod: 25

## 2024-04-11 PROCEDURE — 11719 TRIM NAIL(S) ANY NUMBER: CPT | Mod: 59,Q8

## 2024-04-11 PROCEDURE — 11056 PARNG/CUTG B9 HYPRKR LES 2-4: CPT | Mod: 59,Q8

## 2024-04-11 RX ORDER — TRIPROLIDINE/PSEUDOEPHEDRINE 2.5MG-60MG
TABLET ORAL
Refills: 0 | Status: ACTIVE | COMMUNITY

## 2024-04-11 RX ORDER — INSULIN ASPART 100 [IU]/ML
INJECTION, SOLUTION INTRAVENOUS; SUBCUTANEOUS
Refills: 0 | Status: ACTIVE | COMMUNITY

## 2024-04-11 RX ORDER — CINNAMON BARK 500 MG
CAPSULE ORAL
Refills: 0 | Status: ACTIVE | COMMUNITY

## 2024-04-11 NOTE — REASON FOR VISIT
[Follow-Up Visit] : a follow-up visit for [FreeTextEntry2] : Festus is a 78 year old male who returns and complains of a pain in his right heel, a corn, calluses, toe nails that are difficult to cut, and long toe nails. The patient is a diabetic under the care of Edison Martino. DLS by Referring doctor: 01/11/2024. [place] Pain: Subject to breakdown.  Callus Pain: Subject to breakdown. Mycotic Nail Pain: None. Non-Dystrophic Nail Pain: None.

## 2024-04-11 NOTE — PROCEDURE
[FreeTextEntry1] : Plan:  Right 5th toe - Examination performed. Rx: Lidocaine Sheefa compound for pain. Surgical debridement of the wound site to remove the hyperkeratotic and necrotic tissue.  (Xz=[id number]).  Only serous drainage was expressed.  Dressed with Betadine, Neosporin and a dry sterile dressing.  Patient to perform wound care daily until resolved.  Keratotic lesion was debrided. (Yg=[id number]). Plantar medial aspect of right heel and plantar medial aspect of left heel -. Right 1st toenail and left 1st toenail - Fungal nails were debrided using manual cutters and electrical  to patient tolerance. (Wk=[id number]). Right 2nd toenail, right 3rd toenail, right 4th toenail, right 5th toenail, left 2nd toenail, left 3rd toenail, left 4th toenail and left 5th toenail - elongated nails were trimmed, (Nv=[id number]).  Patient to return: 2 Weeks

## 2024-04-11 NOTE — PHYSICAL EXAM
[de-identified] : Orthopedic Exam:  Patient presents with hammertoe contractures of the lesser digits. [FreeTextEntry2] : 1.0 [FreeTextEntry3] : 0.25 [FreeTextEntry4] : .025 cm [de-identified] : fissure [de-identified] : hyperkeratotic [TWNoteComboBox1] : Right [TWNoteComboBox2] : 1 [TWNoteComboBox4] : None [TWNoteComboBox6] : Other [de-identified] : other [de-identified] : None [de-identified] : <20% [de-identified] : <20% [TWNoteComboBox7] : Sandra [de-identified] : Debridement performed of all devitalized tissue to bleeding viable tissue [FreeTextEntry1] : Neurological Exam: Achilles Reflex - L: WNL. Achilles Reflex - R: WNL. Patellar - L: WNL. Patellar - R: WNL. Sharp / Dull - L: Decreased. Sharp / Dull - R: Decreased. Light Touch/pressure - L: Decreased. Light Touch/pressure - R: Decreased. Hot/cold - L: Decreased. Hot/cold - R: Decreased. Vibratory - L: Decreased. Vibratory - R: Decreased. [place] Garo 5.07 - L: Hallux. 1st Interspace,  5th digit,  Diminished. [place] Garo 5.07 - R: Hallux. 1st Interspace,  5th digit,  Diminished

## 2024-05-23 ENCOUNTER — APPOINTMENT (OUTPATIENT)
Dept: PODIATRY | Facility: CLINIC | Age: 79
End: 2024-05-23
Payer: MEDICARE

## 2024-05-23 DIAGNOSIS — L89.890 PRESSURE ULCER OF OTHER SITE, UNSTAGEABLE: ICD-10-CM

## 2024-05-23 PROCEDURE — 99212 OFFICE O/P EST SF 10 MIN: CPT

## 2024-05-23 NOTE — PHYSICAL EXAM
[de-identified] : Orthopedic Exam:  Patient presents with hammertoe contractures of the lesser digits. [FreeTextEntry2] : 1.0 [FreeTextEntry3] : 0.25 [FreeTextEntry4] : .025 cm [de-identified] : fissure [de-identified] : hyperkeratotic [TWNoteComboBox1] : Right [TWNoteComboBox2] : 1 [TWNoteComboBox4] : None [TWNoteComboBox6] : Other [de-identified] : other [de-identified] : None [de-identified] : <20% [de-identified] : <20% [TWNoteComboBox7] : Sandra [de-identified] : Debridement performed of all devitalized tissue to bleeding viable tissue [FreeTextEntry1] : Neurological Exam: Achilles Reflex - L: WNL. Achilles Reflex - R: WNL. Patellar - L: WNL. Patellar - R: WNL. Sharp / Dull - L: Decreased. Sharp / Dull - R: Decreased. Light Touch/pressure - L: Decreased. Light Touch/pressure - R: Decreased. Hot/cold - L: Decreased. Hot/cold - R: Decreased. Vibratory - L: Decreased. Vibratory - R: Decreased. [place] Garo 5.07 - L: Hallux. 1st Interspace,  5th digit,  Diminished. [place] Garo 5.07 - R: Hallux. 1st Interspace,  5th digit,  Diminished

## 2024-05-23 NOTE — HISTORY OF PRESENT ILLNESS
[FreeTextEntry1] : Festus is a 78-year-old male who presents for continued care of a wound on his right heel.  He stopped his wound care a few weeks ago.  It started to bother him.  he was concerned it was returning.
11

## 2024-05-23 NOTE — PROCEDURE
[FreeTextEntry1] : Plan:  Examination.  ([id number]=[id number]).  Surgical debridement of the previous wound site on the right heel as described above.  We reassured the patient is has not opened.  D/ C wound care.  Patient to apply lotion daily.  Discussed the risk of recurrence. Patient to return: 1 month.

## 2024-06-19 NOTE — ASU PATIENT PROFILE, ADULT - MENTAL HEALTH CONDITIONS/SYMPTOMS, PROFILE
No care due was identified.  [facility] Embedded Care Due Messages. Reference number: [id number].   6/19/2024 3:21:52 PM CDT   none

## 2024-07-03 ENCOUNTER — APPOINTMENT (OUTPATIENT)
Dept: PODIATRY | Facility: CLINIC | Age: 79
End: 2024-07-03
Payer: MEDICARE

## 2024-07-03 DIAGNOSIS — I70.209 UNSPECIFIED ATHEROSCLEROSIS OF NATIVE ARTERIES OF EXTREMITIES, UNSPECIFIED EXTREMITY: ICD-10-CM

## 2024-07-03 DIAGNOSIS — B35.1 TINEA UNGUIUM: ICD-10-CM

## 2024-07-03 PROCEDURE — 11719 TRIM NAIL(S) ANY NUMBER: CPT | Mod: Q8

## 2024-07-03 PROCEDURE — 11055 PARING/CUTG B9 HYPRKER LES 1: CPT | Mod: Q8

## 2024-07-03 PROCEDURE — 11720 DEBRIDE NAIL 1-5: CPT | Mod: 59,Q8

## 2024-08-14 ENCOUNTER — APPOINTMENT (OUTPATIENT)
Dept: PODIATRY | Facility: CLINIC | Age: 79
End: 2024-08-14
Payer: MEDICARE

## 2024-08-14 DIAGNOSIS — M79.671 PAIN IN RIGHT FOOT: ICD-10-CM

## 2024-08-14 DIAGNOSIS — B07.0 PLANTAR WART: ICD-10-CM

## 2024-08-14 PROCEDURE — 99212 OFFICE O/P EST SF 10 MIN: CPT | Mod: 25

## 2024-08-14 PROCEDURE — 17110 DESTRUCTION B9 LES UP TO 14: CPT

## 2024-08-15 PROBLEM — B07.0 VERRUCA PLANTARIS: Status: ACTIVE | Noted: 2024-08-15

## 2024-08-15 NOTE — PHYSICAL EXAM
[de-identified] : Orthopedic Exam:  Patient presents with hammertoe contractures of the lesser digits. [FreeTextEntry2] : 1.0 [FreeTextEntry3] : 0.25 [FreeTextEntry4] : .025 cm [de-identified] : fissure [de-identified] : hyperkeratotic [TWNoteComboBox1] : Right [TWNoteComboBox2] : 1 [TWNoteComboBox4] : None [TWNoteComboBox6] : Other [de-identified] : other [de-identified] : None [de-identified] : <20% [de-identified] : <20% [TWNoteComboBox7] : Sandra [de-identified] : Debridement performed of all devitalized tissue to bleeding viable tissue [FreeTextEntry1] : Neurological Exam: Achilles Reflex - L: WNL. Achilles Reflex - R: WNL. Patellar - L: WNL. Patellar - R: WNL. Sharp / Dull - L: Decreased. Sharp / Dull - R: Decreased. Light Touch/pressure - L: Decreased. Light Touch/pressure - R: Decreased. Hot/cold - L: Decreased. Hot/cold - R: Decreased. Vibratory - L: Decreased. Vibratory - R: Decreased. [place] Garo 5.07 - L: Hallux. 1st Interspace,  5th digit,  Diminished. [place] Garo 5.07 - R: Hallux. 1st Interspace,  5th digit,  Diminished

## 2024-08-15 NOTE — HISTORY OF PRESENT ILLNESS
[FreeTextEntry1] : Festus is a 78-year-old male who presents to the office for care of pain on the bottom of his right foot.  It started a few weeks ago.  He feels something hard but can't see anything.

## 2024-08-15 NOTE — PHYSICAL EXAM
[de-identified] : Orthopedic Exam:  Patient presents with hammertoe contractures of the lesser digits. [FreeTextEntry2] : 1.0 [FreeTextEntry3] : 0.25 [FreeTextEntry4] : .025 cm [de-identified] : fissure [de-identified] : hyperkeratotic [TWNoteComboBox1] : Right [TWNoteComboBox2] : 1 [TWNoteComboBox4] : None [TWNoteComboBox6] : Other [de-identified] : other [de-identified] : None [de-identified] : <20% [de-identified] : <20% [TWNoteComboBox7] : Sandra [de-identified] : Debridement performed of all devitalized tissue to bleeding viable tissue [FreeTextEntry1] : Neurological Exam: Achilles Reflex - L: WNL. Achilles Reflex - R: WNL. Patellar - L: WNL. Patellar - R: WNL. Sharp / Dull - L: Decreased. Sharp / Dull - R: Decreased. Light Touch/pressure - L: Decreased. Light Touch/pressure - R: Decreased. Hot/cold - L: Decreased. Hot/cold - R: Decreased. Vibratory - L: Decreased. Vibratory - R: Decreased. [place] Garo 5.07 - L: Hallux. 1st Interspace,  5th digit,  Diminished. [place] Garo 5.07 - R: Hallux. 1st Interspace,  5th digit,  Diminished

## 2024-08-15 NOTE — PHYSICAL EXAM
[de-identified] : Orthopedic Exam:  Patient presents with hammertoe contractures of the lesser digits. [FreeTextEntry2] : 1.0 [FreeTextEntry3] : 0.25 [FreeTextEntry4] : .025 cm [de-identified] : fissure [de-identified] : hyperkeratotic [TWNoteComboBox1] : Right [TWNoteComboBox2] : 1 [TWNoteComboBox4] : None [TWNoteComboBox6] : Other [de-identified] : other [de-identified] : None [de-identified] : <20% [de-identified] : <20% [TWNoteComboBox7] : Sandra [de-identified] : Debridement performed of all devitalized tissue to bleeding viable tissue [FreeTextEntry1] : Neurological Exam: Achilles Reflex - L: WNL. Achilles Reflex - R: WNL. Patellar - L: WNL. Patellar - R: WNL. Sharp / Dull - L: Decreased. Sharp / Dull - R: Decreased. Light Touch/pressure - L: Decreased. Light Touch/pressure - R: Decreased. Hot/cold - L: Decreased. Hot/cold - R: Decreased. Vibratory - L: Decreased. Vibratory - R: Decreased. [place] Garo 5.07 - L: Hallux. 1st Interspace,  5th digit,  Diminished. [place] Garo 5.07 - R: Hallux. 1st Interspace,  5th digit,  Diminished

## 2024-08-28 ENCOUNTER — APPOINTMENT (OUTPATIENT)
Dept: PODIATRY | Facility: CLINIC | Age: 79
End: 2024-08-28

## 2024-08-28 ENCOUNTER — APPOINTMENT (OUTPATIENT)
Dept: PODIATRY | Facility: CLINIC | Age: 79
End: 2024-08-28
Payer: MEDICARE

## 2024-08-28 DIAGNOSIS — B07.0 PLANTAR WART: ICD-10-CM

## 2024-08-28 PROCEDURE — 99212 OFFICE O/P EST SF 10 MIN: CPT

## 2024-08-28 NOTE — PHYSICAL EXAM
[FreeTextEntry3] : Vascular Exam: DP Pulse (left): Absent. DP Pulse (right): Absent. PT Pulse (left): Absent. PT Pulse (right): Absent. Capillary Return - L: Delayed. Capillary Return - R: Delayed. Temperature Grad - L: Warm to Cool. Temperature Grad - R: Warm to Cool. Skin Texture - L: Thin. Shiny. Dry. Atrophic. Skin Texture - R: Thin. Shiny. Dry. Atrophic. Skin Color - L: Ruborous. Skin Color - R: Ruborous. Hair Growth: Decreased. Turgor: Decreased.  [de-identified] : Orthopedic Exam:  Patient presents with hammertoe contractures of the lesser digits. [FreeTextEntry1] : Neurological Exam: Achilles Reflex - L: WNL. Achilles Reflex - R: WNL. Patellar - L: WNL. Patellar - R: WNL. Sharp / Dull - L: Decreased. Sharp / Dull - R: Decreased. Light Touch/pressure - L: Decreased. Light Touch/pressure - R: Decreased. Hot/cold - L: Decreased. Hot/cold - R: Decreased. Vibratory - L: Decreased. Vibratory - R: Decreased. [place] Garo 5.07 - L: Hallux. 1st Interspace,  5th digit,  Diminished. [place] Garo 5.07 - R: Hallux. 1st Interspace,  5th digit,  Diminished

## 2024-08-28 NOTE — PROCEDURE
[FreeTextEntry1] : Plan:  Examination.  (Lm=[id number]).  Surgical debridement of the lesion as described.  We discussed the risk of recurrence and how to avoid it. Patient to return: 4 weeks.

## 2024-08-28 NOTE — HISTORY OF PRESENT ILLNESS
[FreeTextEntry1] : Festus is a 78-year-old male who presents today for continued care of a wart on his right foot.  He has had no pain since his last visit.  His wife has been helping him apply gel to it and keeping it covered.

## 2024-08-29 ENCOUNTER — APPOINTMENT (OUTPATIENT)
Dept: PODIATRY | Facility: CLINIC | Age: 79
End: 2024-08-29

## 2024-09-11 ENCOUNTER — APPOINTMENT (OUTPATIENT)
Dept: PODIATRY | Facility: CLINIC | Age: 79
End: 2024-09-11

## 2024-09-12 ENCOUNTER — APPOINTMENT (OUTPATIENT)
Dept: PODIATRY | Facility: CLINIC | Age: 79
End: 2024-09-12

## 2024-10-02 ENCOUNTER — APPOINTMENT (OUTPATIENT)
Dept: PODIATRY | Facility: CLINIC | Age: 79
End: 2024-10-02

## 2024-10-10 ENCOUNTER — APPOINTMENT (OUTPATIENT)
Dept: PODIATRY | Facility: CLINIC | Age: 79
End: 2024-10-10

## 2024-10-10 DIAGNOSIS — I70.209 UNSPECIFIED ATHEROSCLEROSIS OF NATIVE ARTERIES OF EXTREMITIES, UNSPECIFIED EXTREMITY: ICD-10-CM

## 2024-10-10 DIAGNOSIS — B35.1 TINEA UNGUIUM: ICD-10-CM

## 2024-10-10 PROCEDURE — 11056 PARNG/CUTG B9 HYPRKR LES 2-4: CPT | Mod: Q8

## 2024-10-10 PROCEDURE — 11720 DEBRIDE NAIL 1-5: CPT | Mod: 59,Q8

## 2024-10-10 PROCEDURE — 11719 TRIM NAIL(S) ANY NUMBER: CPT | Mod: Q8

## 2024-11-21 ENCOUNTER — APPOINTMENT (OUTPATIENT)
Dept: PODIATRY | Facility: CLINIC | Age: 79
End: 2024-11-21
Payer: MEDICARE

## 2024-11-21 ENCOUNTER — NON-APPOINTMENT (OUTPATIENT)
Age: 79
End: 2024-11-21

## 2024-11-21 DIAGNOSIS — B07.0 PLANTAR WART: ICD-10-CM

## 2024-11-21 PROCEDURE — 99212 OFFICE O/P EST SF 10 MIN: CPT

## 2025-01-03 ENCOUNTER — APPOINTMENT (OUTPATIENT)
Dept: PODIATRY | Facility: CLINIC | Age: 80
End: 2025-01-03

## 2025-01-03 DIAGNOSIS — I70.209 UNSPECIFIED ATHEROSCLEROSIS OF NATIVE ARTERIES OF EXTREMITIES, UNSPECIFIED EXTREMITY: ICD-10-CM

## 2025-01-03 DIAGNOSIS — B35.1 TINEA UNGUIUM: ICD-10-CM

## 2025-01-03 PROCEDURE — 11720 DEBRIDE NAIL 1-5: CPT | Mod: 59,Q8

## 2025-01-03 PROCEDURE — 11056 PARNG/CUTG B9 HYPRKR LES 2-4: CPT | Mod: Q8

## 2025-01-03 PROCEDURE — 11719 TRIM NAIL(S) ANY NUMBER: CPT | Mod: Q8

## 2025-02-14 ENCOUNTER — NON-APPOINTMENT (OUTPATIENT)
Age: 80
End: 2025-02-14

## 2025-02-14 ENCOUNTER — APPOINTMENT (OUTPATIENT)
Dept: PODIATRY | Facility: CLINIC | Age: 80
End: 2025-02-14
Payer: MEDICARE

## 2025-02-14 DIAGNOSIS — L85.3 XEROSIS CUTIS: ICD-10-CM

## 2025-02-14 DIAGNOSIS — E11.9 TYPE 2 DIABETES MELLITUS W/OUT COMPLICATIONS: ICD-10-CM

## 2025-02-14 PROCEDURE — G2211 COMPLEX E/M VISIT ADD ON: CPT

## 2025-02-14 PROCEDURE — 99212 OFFICE O/P EST SF 10 MIN: CPT

## 2025-02-15 PROBLEM — L85.3 XEROSIS CUTIS: Status: ACTIVE | Noted: 2025-02-15

## 2025-03-28 ENCOUNTER — APPOINTMENT (OUTPATIENT)
Dept: PODIATRY | Facility: CLINIC | Age: 80
End: 2025-03-28
Payer: MEDICARE

## 2025-03-28 DIAGNOSIS — L85.3 XEROSIS CUTIS: ICD-10-CM

## 2025-03-28 PROCEDURE — 99212 OFFICE O/P EST SF 10 MIN: CPT

## 2025-04-23 ENCOUNTER — APPOINTMENT (OUTPATIENT)
Dept: CARDIOLOGY | Facility: CLINIC | Age: 80
End: 2025-04-23
Payer: MEDICARE

## 2025-04-23 ENCOUNTER — NON-APPOINTMENT (OUTPATIENT)
Age: 80
End: 2025-04-23

## 2025-04-23 VITALS
HEIGHT: 62 IN | DIASTOLIC BLOOD PRESSURE: 77 MMHG | OXYGEN SATURATION: 99 % | WEIGHT: 195 LBS | SYSTOLIC BLOOD PRESSURE: 129 MMHG | HEART RATE: 93 BPM | BODY MASS INDEX: 35.88 KG/M2

## 2025-04-23 VITALS — DIASTOLIC BLOOD PRESSURE: 70 MMHG | SYSTOLIC BLOOD PRESSURE: 117 MMHG

## 2025-04-23 DIAGNOSIS — I77.819 AORTIC ECTASIA, UNSPECIFIED SITE: ICD-10-CM

## 2025-04-23 DIAGNOSIS — E78.5 HYPERLIPIDEMIA, UNSPECIFIED: ICD-10-CM

## 2025-04-23 DIAGNOSIS — I51.89 OTHER ILL-DEFINED HEART DISEASES: ICD-10-CM

## 2025-04-23 PROCEDURE — 93306 TTE W/DOPPLER COMPLETE: CPT

## 2025-04-23 PROCEDURE — 93000 ELECTROCARDIOGRAM COMPLETE: CPT

## 2025-04-23 PROCEDURE — 99214 OFFICE O/P EST MOD 30 MIN: CPT

## 2025-04-23 RX ORDER — SEMAGLUTIDE 2.68 MG/ML
INJECTION, SOLUTION SUBCUTANEOUS
Refills: 0 | Status: ACTIVE | COMMUNITY

## 2025-05-02 ENCOUNTER — NON-APPOINTMENT (OUTPATIENT)
Age: 80
End: 2025-05-02

## 2025-05-02 ENCOUNTER — APPOINTMENT (OUTPATIENT)
Dept: PODIATRY | Facility: CLINIC | Age: 80
End: 2025-05-02
Payer: MEDICARE

## 2025-05-02 DIAGNOSIS — L85.3 XEROSIS CUTIS: ICD-10-CM

## 2025-05-02 PROCEDURE — 99212 OFFICE O/P EST SF 10 MIN: CPT

## 2025-05-07 ENCOUNTER — APPOINTMENT (OUTPATIENT)
Dept: CARDIOLOGY | Facility: CLINIC | Age: 80
End: 2025-05-07
Payer: MEDICARE

## 2025-05-07 PROCEDURE — 78452 HT MUSCLE IMAGE SPECT MULT: CPT

## 2025-05-07 PROCEDURE — 93015 CV STRESS TEST SUPVJ I&R: CPT

## 2025-05-07 PROCEDURE — A9500: CPT

## 2025-05-15 ENCOUNTER — APPOINTMENT (OUTPATIENT)
Dept: PODIATRY | Facility: CLINIC | Age: 80
End: 2025-05-15
Payer: MEDICARE

## 2025-05-15 DIAGNOSIS — B35.1 TINEA UNGUIUM: ICD-10-CM

## 2025-05-15 DIAGNOSIS — I70.209 UNSPECIFIED ATHEROSCLEROSIS OF NATIVE ARTERIES OF EXTREMITIES, UNSPECIFIED EXTREMITY: ICD-10-CM

## 2025-05-15 PROCEDURE — 11056 PARNG/CUTG B9 HYPRKR LES 2-4: CPT | Mod: 59,Q8

## 2025-05-15 PROCEDURE — 11720 DEBRIDE NAIL 1-5: CPT | Mod: 59,Q8

## 2025-05-15 PROCEDURE — 11719 TRIM NAIL(S) ANY NUMBER: CPT | Mod: 59,Q8

## 2025-06-27 ENCOUNTER — APPOINTMENT (OUTPATIENT)
Dept: PODIATRY | Facility: CLINIC | Age: 80
End: 2025-06-27

## 2025-07-10 ENCOUNTER — APPOINTMENT (OUTPATIENT)
Dept: PODIATRY | Facility: CLINIC | Age: 80
End: 2025-07-10
Payer: MEDICARE

## 2025-07-10 PROCEDURE — 99212 OFFICE O/P EST SF 10 MIN: CPT

## 2025-07-11 ENCOUNTER — RX RENEWAL (OUTPATIENT)
Age: 80
End: 2025-07-11

## 2025-07-15 PROBLEM — M79.673 FOOT PAIN: Status: ACTIVE | Noted: 2025-07-15

## 2025-08-05 ENCOUNTER — APPOINTMENT (OUTPATIENT)
Dept: PODIATRY | Facility: CLINIC | Age: 80
End: 2025-08-05
Payer: MEDICARE

## 2025-08-05 DIAGNOSIS — M20.41 OTHER HAMMER TOE(S) (ACQUIRED), RIGHT FOOT: ICD-10-CM

## 2025-08-05 DIAGNOSIS — M77.41 METATARSALGIA, RIGHT FOOT: ICD-10-CM

## 2025-08-05 DIAGNOSIS — M71.579: ICD-10-CM

## 2025-08-05 DIAGNOSIS — M25.572 PAIN IN LEFT ANKLE AND JOINTS OF LEFT FOOT: ICD-10-CM

## 2025-08-05 DIAGNOSIS — M25.571 PAIN IN RIGHT ANKLE AND JOINTS OF RIGHT FOOT: ICD-10-CM

## 2025-08-05 DIAGNOSIS — M77.42 METATARSALGIA, LEFT FOOT: ICD-10-CM

## 2025-08-05 DIAGNOSIS — M71.572 OTHER BURSITIS, NOT ELSEWHERE CLASSIFIED, LEFT ANKLE AND FOOT: ICD-10-CM

## 2025-08-05 PROCEDURE — 99213 OFFICE O/P EST LOW 20 MIN: CPT | Mod: 25

## 2025-08-05 PROCEDURE — 73630 X-RAY EXAM OF FOOT: CPT | Mod: 50

## 2025-08-05 PROCEDURE — 20600 DRAIN/INJ JOINT/BURSA W/O US: CPT | Mod: 50

## 2025-09-18 ENCOUNTER — APPOINTMENT (OUTPATIENT)
Dept: PODIATRY | Facility: CLINIC | Age: 80
End: 2025-09-18